# Patient Record
Sex: FEMALE | Race: WHITE | NOT HISPANIC OR LATINO | Employment: STUDENT | URBAN - METROPOLITAN AREA
[De-identification: names, ages, dates, MRNs, and addresses within clinical notes are randomized per-mention and may not be internally consistent; named-entity substitution may affect disease eponyms.]

---
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
## 2023-01-30 ENCOUNTER — OFFICE VISIT (OUTPATIENT)
Dept: URGENT CARE | Facility: CLINIC | Age: 18
End: 2023-01-30
Payer: COMMERCIAL

## 2023-01-30 VITALS
WEIGHT: 122 LBS | TEMPERATURE: 99 F | HEIGHT: 65 IN | DIASTOLIC BLOOD PRESSURE: 65 MMHG | SYSTOLIC BLOOD PRESSURE: 109 MMHG | RESPIRATION RATE: 20 BRPM | OXYGEN SATURATION: 100 % | BODY MASS INDEX: 20.33 KG/M2 | HEART RATE: 86 BPM

## 2023-01-30 DIAGNOSIS — N39.0 URINARY TRACT INFECTION WITH HEMATURIA, SITE UNSPECIFIED: Primary | ICD-10-CM

## 2023-01-30 DIAGNOSIS — R31.9 URINARY TRACT INFECTION WITH HEMATURIA, SITE UNSPECIFIED: Primary | ICD-10-CM

## 2023-01-30 LAB
B-HCG UR QL: NEGATIVE
BILIRUB UR QL STRIP: NEGATIVE
CTP QC/QA: YES
GLUCOSE UR QL STRIP: NEGATIVE
KETONES UR QL STRIP: NEGATIVE
LEUKOCYTE ESTERASE UR QL STRIP: NEGATIVE
PH, POC UA: 7.5
POC BLOOD, URINE: POSITIVE
POC NITRATES, URINE: NEGATIVE
PROT UR QL STRIP: NEGATIVE
SP GR UR STRIP: 1.01 (ref 1–1.03)
UROBILINOGEN UR STRIP-ACNC: ABNORMAL (ref 0.1–1.1)

## 2023-01-30 PROCEDURE — 99213 PR OFFICE/OUTPT VISIT, EST, LEVL III, 20-29 MIN: ICD-10-PCS | Mod: S$GLB,,, | Performed by: PHYSICIAN ASSISTANT

## 2023-01-30 PROCEDURE — 1159F MED LIST DOCD IN RCRD: CPT | Mod: CPTII,S$GLB,, | Performed by: PHYSICIAN ASSISTANT

## 2023-01-30 PROCEDURE — 3074F PR MOST RECENT SYSTOLIC BLOOD PRESSURE < 130 MM HG: ICD-10-PCS | Mod: CPTII,S$GLB,, | Performed by: PHYSICIAN ASSISTANT

## 2023-01-30 PROCEDURE — 3078F PR MOST RECENT DIASTOLIC BLOOD PRESSURE < 80 MM HG: ICD-10-PCS | Mod: CPTII,S$GLB,, | Performed by: PHYSICIAN ASSISTANT

## 2023-01-30 PROCEDURE — 81003 URINALYSIS AUTO W/O SCOPE: CPT | Mod: QW,S$GLB,, | Performed by: PHYSICIAN ASSISTANT

## 2023-01-30 PROCEDURE — 99213 OFFICE O/P EST LOW 20 MIN: CPT | Mod: S$GLB,,, | Performed by: PHYSICIAN ASSISTANT

## 2023-01-30 PROCEDURE — 3078F DIAST BP <80 MM HG: CPT | Mod: CPTII,S$GLB,, | Performed by: PHYSICIAN ASSISTANT

## 2023-01-30 PROCEDURE — 87086 URINE CULTURE/COLONY COUNT: CPT | Performed by: PHYSICIAN ASSISTANT

## 2023-01-30 PROCEDURE — 3008F BODY MASS INDEX DOCD: CPT | Mod: CPTII,S$GLB,, | Performed by: PHYSICIAN ASSISTANT

## 2023-01-30 PROCEDURE — 81025 POCT URINE PREGNANCY: ICD-10-PCS | Mod: S$GLB,,, | Performed by: PHYSICIAN ASSISTANT

## 2023-01-30 PROCEDURE — 3074F SYST BP LT 130 MM HG: CPT | Mod: CPTII,S$GLB,, | Performed by: PHYSICIAN ASSISTANT

## 2023-01-30 PROCEDURE — 81025 URINE PREGNANCY TEST: CPT | Mod: S$GLB,,, | Performed by: PHYSICIAN ASSISTANT

## 2023-01-30 PROCEDURE — 81003 POCT URINALYSIS, DIPSTICK, AUTOMATED, W/O SCOPE: ICD-10-PCS | Mod: QW,S$GLB,, | Performed by: PHYSICIAN ASSISTANT

## 2023-01-30 PROCEDURE — 87088 URINE BACTERIA CULTURE: CPT | Performed by: PHYSICIAN ASSISTANT

## 2023-01-30 PROCEDURE — 1160F PR REVIEW ALL MEDS BY PRESCRIBER/CLIN PHARMACIST DOCUMENTED: ICD-10-PCS | Mod: CPTII,S$GLB,, | Performed by: PHYSICIAN ASSISTANT

## 2023-01-30 PROCEDURE — 1159F PR MEDICATION LIST DOCUMENTED IN MEDICAL RECORD: ICD-10-PCS | Mod: CPTII,S$GLB,, | Performed by: PHYSICIAN ASSISTANT

## 2023-01-30 PROCEDURE — 3008F PR BODY MASS INDEX (BMI) DOCUMENTED: ICD-10-PCS | Mod: CPTII,S$GLB,, | Performed by: PHYSICIAN ASSISTANT

## 2023-01-30 PROCEDURE — 1160F RVW MEDS BY RX/DR IN RCRD: CPT | Mod: CPTII,S$GLB,, | Performed by: PHYSICIAN ASSISTANT

## 2023-01-30 RX ORDER — CIPROFLOXACIN 500 MG/1
500 TABLET ORAL 2 TIMES DAILY
Qty: 10 TABLET | Refills: 0 | Status: SHIPPED | OUTPATIENT
Start: 2023-01-30 | End: 2023-02-04

## 2023-01-30 NOTE — LETTER
January 30, 2023      [facility]  [address] [place] [address] [place] [address]  Phone: [phone]  Fax: [phone]       Patient: Andree Zavala   YOB: 2005  Date of Visit: 01/30/2023    To Whom It May Concern:    Joesph Zavala  was at [facility] on 01/30/2023. The patient may return to work/school on 2/1/23 with no restrictions. If you have any questions or concerns, or if I can be of further assistance, please do not hesitate to contact me.    Sincerely,    Charley Menezes PA-C

## 2023-01-30 NOTE — PROGRESS NOTES
"Subjective:       Patient ID: Andree Zavala is a 17 y.o. female.    Vitals:  height is 5' 5" (1.651 m) and weight is 55.3 kg (122 lb). Her temperature is 98.6 °F (37 °C). Her blood pressure is 109/65 and her pulse is 86. Her respiration is 20 and oxygen saturation is 100%.     Chief Complaint: Dysuria    Patient is a 17 year old female who presents to the urgent care with a one week history of dysuria and left flank pain. Patient denies fever, chills, nausea, vomiting or diarrhea. She has taken AZO and cranberry juice with relief of frequency and hematuria but still admits flank pain. Patient denies vaginal discharge or itching. No concern for pregnancy or STD. Patient is otherwise well.     Dysuria   This is a new problem. The current episode started in the past 7 days (Onset a couple of days ago). The problem occurs every urination. The problem has been gradually worsening. The patient is experiencing no pain. There has been no fever. She is Sexually active. There is No history of pyelonephritis. Associated symptoms include flank pain (left side flank pain), frequency, hematuria, hesitancy, urgency and withholding. Pertinent negatives include no chills, discharge, nausea, possible pregnancy, vomiting, bubble bath use, constipation or rash. She has tried NSAIDs (AZO, cranberry juice, ibupren) for the symptoms. The treatment provided moderate relief. There is no history of diabetes insipidus, diabetes mellitus, hypertension, kidney stones, recurrent UTIs, a single kidney or STD.     Constitution: Negative for chills and fever.   Cardiovascular:  Negative for chest pain.   Respiratory:  Negative for shortness of breath.    Gastrointestinal:  Negative for abdominal pain, nausea, vomiting and constipation.   Genitourinary:  Positive for dysuria, frequency, urgency, flank pain (left side flank pain) and hematuria. Negative for vaginal pain, vaginal discharge, vaginal odor, genital sore and pelvic pain. "   Musculoskeletal:  Negative for back pain.   Skin:  Negative for rash.     Objective:      Physical Exam   Constitutional: She is oriented to person, place, and time. She appears well-developed.   HENT:   Head: Normocephalic and atraumatic.   Ears:   Right Ear: External ear normal.   Left Ear: External ear normal.   Nose: Nose normal. No nasal deformity. No epistaxis.   Mouth/Throat: Oropharynx is clear and moist and mucous membranes are normal.   Eyes: Lids are normal.   Neck: Trachea normal and phonation normal. Neck supple.   Cardiovascular: Normal rate, regular rhythm, normal heart sounds and normal pulses.   No murmur heard.  Pulmonary/Chest: Effort normal and breath sounds normal. No respiratory distress.   Abdominal: Normal appearance. She exhibits no distension. Soft. There is no abdominal tenderness. There is no rebound, no guarding, no left CVA tenderness and no right CVA tenderness.   Neurological: She is alert and oriented to person, place, and time.   Skin: Skin is warm, dry and intact.   Psychiatric: Her speech is normal and behavior is normal.   Nursing note and vitals reviewed.      Results for orders placed or performed in visit on 01/30/23   POCT Urinalysis, Dipstick, Automated, W/O Scope   Result Value Ref Range    POC Blood, Urine Positive (A) Negative    POC Bilirubin, Urine Negative Negative    POC Urobilinogen, Urine Norm 0.1 - 1.1    POC Ketones, Urine Negative Negative    POC Protein, Urine Negative Negative    POC Nitrates, Urine Negative Negative    POC Glucose, Urine Negative Negative    pH, UA 7.5     POC Specific Gravity, Urine 1.015 1.003 - 1.029    POC Leukocytes, Urine Negative Negative   POCT urine pregnancy   Result Value Ref Range    POC Preg Test, Ur Negative Negative     Acceptable Yes        Assessment:       1. Urinary tract infection with hematuria, site unspecified            Plan:         Urinary tract infection with hematuria, site unspecified  -     POCT  Urinalysis, Dipstick, Automated, W/O Scope  -     POCT urine pregnancy  -     Culture, Urine  -     ciprofloxacin HCl (CIPRO) 500 MG tablet; Take 1 tablet (500 mg total) by mouth 2 (two) times daily. for 5 days  Dispense: 10 tablet; Refill: 0    Discussed urinalysis today.   Discussed will send urine for culture and call with results in 3-5 days.   Discussed begin taking antibiotics as prescribed for suspected UTI. Drink cranberry juice, increase fluids, and take over the counter pyridium for symptom control.   Return to Clinic if symptoms worsen, change, or persist.   ER precautions for red flag symptoms discussed with patient.     Patient verbalized understanding and agrees with plan.     Charley Menezes PA-C    Patient Instructions                                                                       UTI   If your condition worsens or fails to improve we recommend that you receive another evaluation at the ER immediately or contact your PCP to discuss your concerns or return here. You must understand that you've received an urgent care treatment only and that you may be released before all your medical problems are known or treated. You the patient will arrange for followup care as instructed.   If you were prescribed antibiotics, please take them to full completion.    If you had cultures done it will take 3-5 days to result. We will call you with the result.   If you are are female and on BCP use additional methods to prevent pregnancy while on the antibiotics and for one cycle after.   Cranberry juice may help. Get the 100% cranberry juice and mix 4 oz of juice with 4 oz of water and drink this 8 oz glass of liquid once a day.

## 2023-02-01 LAB — BACTERIA UR CULT: ABNORMAL

## 2023-07-07 ENCOUNTER — OFFICE VISIT (OUTPATIENT)
Dept: URGENT CARE | Facility: CLINIC | Age: 18
End: 2023-07-07
Payer: COMMERCIAL

## 2023-07-07 VITALS
OXYGEN SATURATION: 98 % | HEART RATE: 76 BPM | RESPIRATION RATE: 18 BRPM | DIASTOLIC BLOOD PRESSURE: 70 MMHG | TEMPERATURE: 99 F | SYSTOLIC BLOOD PRESSURE: 117 MMHG | BODY MASS INDEX: 18.83 KG/M2 | HEIGHT: 65 IN | WEIGHT: 113 LBS

## 2023-07-07 DIAGNOSIS — J00 NASOPHARYNGITIS: Primary | ICD-10-CM

## 2023-07-07 LAB
CTP QC/QA: YES
MOLECULAR STREP A: NEGATIVE

## 2023-07-07 PROCEDURE — 99213 OFFICE O/P EST LOW 20 MIN: CPT | Mod: S$GLB,,, | Performed by: PHYSICIAN ASSISTANT

## 2023-07-07 PROCEDURE — 99213 PR OFFICE/OUTPT VISIT, EST, LEVL III, 20-29 MIN: ICD-10-PCS | Mod: S$GLB,,, | Performed by: PHYSICIAN ASSISTANT

## 2023-07-07 PROCEDURE — 87651 STREP A DNA AMP PROBE: CPT | Mod: QW,S$GLB,, | Performed by: PHYSICIAN ASSISTANT

## 2023-07-07 PROCEDURE — 87651 POCT STREP A MOLECULAR: ICD-10-PCS | Mod: QW,S$GLB,, | Performed by: PHYSICIAN ASSISTANT

## 2023-07-07 RX ORDER — TRAZODONE HYDROCHLORIDE 50 MG/1
50 TABLET ORAL NIGHTLY
COMMUNITY
End: 2024-01-23

## 2023-07-07 NOTE — PATIENT INSTRUCTIONS
Claritin or Zyrtec for post nasal drip. Salt water gargles, ibuprofen or naproxen for pain. Honey teas may help. Rest and drink plenty of fluids. May buy over the counter Chloraseptic Lozenges or spray for severe pain.    Over the counter cough syrup as needed.

## 2023-07-07 NOTE — PROGRESS NOTES
"Subjective:      Patient ID: Andree Zavala is a 18 y.o. female.    Vitals:  height is 5' 5" (1.651 m) and weight is 51.3 kg (113 lb). Her temperature is 98.8 °F (37.1 °C). Her blood pressure is 117/70 and her pulse is 76. Her respiration is 18 and oxygen saturation is 98%.     Chief Complaint: Sore Throat    Sore throat, occasional dry cough, PND x 4 days. No fever, HA, body aches, CP or SOB    Sore Throat   This is a new problem. The current episode started in the past 7 days (onset monday). The problem has been unchanged. There has been no fever. The pain is at a severity of 7/10. The pain is moderate. Associated symptoms include coughing, ear pain, neck pain and trouble swallowing. Pertinent negatives include no abdominal pain, congestion, diarrhea, drooling, ear discharge, headaches, hoarse voice, plugged ear sensation, shortness of breath, stridor, swollen glands or vomiting. She has had no exposure to strep or mono. Treatments tried: ibu, advil. The treatment provided mild relief.     Constitution: Negative for chills, sweating, fatigue and fever.   HENT:  Positive for ear pain, sore throat and trouble swallowing. Negative for ear discharge, drooling and congestion.    Neck: Positive for neck pain.   Cardiovascular:  Negative for chest pain, leg swelling, palpitations and sob on exertion.   Eyes:  Negative for eye discharge, eye itching and eye pain.   Respiratory:  Positive for cough. Negative for sputum production, shortness of breath and stridor.    Gastrointestinal:  Negative for abdominal pain, vomiting and diarrhea.   Genitourinary:  Negative for dysuria.   Musculoskeletal:  Negative for muscle ache.   Neurological:  Negative for headaches.    Objective:     Physical Exam   Constitutional: She is oriented to person, place, and time. She appears well-developed. She is cooperative.  Non-toxic appearance. She does not appear ill. No distress.   HENT:   Head: Normocephalic and atraumatic.   Ears:   Right " Ear: Hearing, tympanic membrane, external ear and ear canal normal.   Left Ear: Hearing, tympanic membrane, external ear and ear canal normal.   Nose: Nose normal. No mucosal edema, rhinorrhea, nasal deformity or congestion. No epistaxis. Right sinus exhibits no maxillary sinus tenderness and no frontal sinus tenderness. Left sinus exhibits no maxillary sinus tenderness and no frontal sinus tenderness.   Mouth/Throat: Uvula is midline and mucous membranes are normal. Mucous membranes are moist. No trismus in the jaw. Normal dentition. No uvula swelling. Posterior oropharyngeal erythema and cobblestoning present. No oropharyngeal exudate or posterior oropharyngeal edema. No tonsillar exudate.   Eyes: Conjunctivae and lids are normal. No scleral icterus.   Neck: Trachea normal and phonation normal. Neck supple. No edema present. No erythema present. No neck rigidity present.   Cardiovascular: Normal rate, regular rhythm, normal heart sounds and normal pulses.   Pulmonary/Chest: Effort normal and breath sounds normal. No respiratory distress. She has no decreased breath sounds. She has no wheezes. She has no rhonchi.   Abdominal: Normal appearance. There is no abdominal tenderness.   Musculoskeletal: Normal range of motion.         General: No deformity. Normal range of motion.   Lymphadenopathy:     She has no cervical adenopathy.   Neurological: She is alert and oriented to person, place, and time. She exhibits normal muscle tone. Coordination normal.   Skin: Skin is warm, dry, intact, not diaphoretic and not pale.   Psychiatric: Her speech is normal and behavior is normal. Judgment and thought content normal.   Nursing note and vitals reviewed.    Results for orders placed or performed in visit on 07/07/23   POCT Strep A, Molecular   Result Value Ref Range    Molecular Strep A, POC Negative Negative     Acceptable Yes        Assessment:     1. Nasopharyngitis        Plan:       Nasopharyngitis  -      POCT Strep A, Molecular      Nicole Valentin PA-C  [facility]       Patient Instructions   Claritin or Zyrtec for post nasal drip. Salt water gargles, ibuprofen or naproxen for pain. Honey teas may help. Rest and drink plenty of fluids. May buy over the counter Chloraseptic Lozenges or spray for severe pain.    Over the counter cough syrup as needed.

## 2023-10-14 ENCOUNTER — OFFICE VISIT (OUTPATIENT)
Dept: INTERNAL MEDICINE | Facility: CLINIC | Age: 18
End: 2023-10-14
Payer: COMMERCIAL

## 2023-10-14 ENCOUNTER — LAB VISIT (OUTPATIENT)
Dept: LAB | Facility: HOSPITAL | Age: 18
End: 2023-10-14
Payer: COMMERCIAL

## 2023-10-14 ENCOUNTER — LAB VISIT (OUTPATIENT)
Dept: LAB | Facility: HOSPITAL | Age: 18
End: 2023-10-14
Attending: INTERNAL MEDICINE
Payer: COMMERCIAL

## 2023-10-14 VITALS
TEMPERATURE: 98 F | SYSTOLIC BLOOD PRESSURE: 118 MMHG | RESPIRATION RATE: 18 BRPM | DIASTOLIC BLOOD PRESSURE: 74 MMHG | HEIGHT: 65 IN | HEART RATE: 83 BPM | BODY MASS INDEX: 20.06 KG/M2 | WEIGHT: 120.38 LBS | OXYGEN SATURATION: 98 %

## 2023-10-14 DIAGNOSIS — F31.9 BIPOLAR AFFECTIVE DISORDER, REMISSION STATUS UNSPECIFIED: ICD-10-CM

## 2023-10-14 DIAGNOSIS — R20.0 NUMBNESS OF TONGUE: Primary | ICD-10-CM

## 2023-10-14 DIAGNOSIS — R20.0 NUMBNESS OF TONGUE: ICD-10-CM

## 2023-10-14 DIAGNOSIS — Z00.00 LABORATORY EXAM ORDERED AS PART OF ROUTINE GENERAL MEDICAL EXAMINATION: ICD-10-CM

## 2023-10-14 LAB
ALBUMIN SERPL BCP-MCNC: 4 G/DL (ref 3.5–5.2)
ALP SERPL-CCNC: 64 U/L (ref 55–135)
ALT SERPL W/O P-5'-P-CCNC: 10 U/L (ref 10–44)
ANION GAP SERPL CALC-SCNC: 8 MMOL/L (ref 8–16)
AST SERPL-CCNC: 14 U/L (ref 10–40)
BASOPHILS # BLD AUTO: 0.03 K/UL (ref 0–0.2)
BASOPHILS NFR BLD: 0.5 % (ref 0–1.9)
BILIRUB SERPL-MCNC: 0.3 MG/DL (ref 0.1–1)
BILIRUB UR QL STRIP: NEGATIVE
BUN SERPL-MCNC: 11 MG/DL (ref 6–20)
CALCIUM SERPL-MCNC: 9.6 MG/DL (ref 8.7–10.5)
CHLORIDE SERPL-SCNC: 109 MMOL/L (ref 95–110)
CHOLEST SERPL-MCNC: 171 MG/DL (ref 120–199)
CHOLEST/HDLC SERPL: 3.2 {RATIO} (ref 2–5)
CLARITY UR: CLEAR
CO2 SERPL-SCNC: 21 MMOL/L (ref 23–29)
COLOR UR: YELLOW
CREAT SERPL-MCNC: 0.7 MG/DL (ref 0.5–1.4)
DIFFERENTIAL METHOD: NORMAL
EOSINOPHIL # BLD AUTO: 0.1 K/UL (ref 0–0.5)
EOSINOPHIL NFR BLD: 2.3 % (ref 0–8)
ERYTHROCYTE [DISTWIDTH] IN BLOOD BY AUTOMATED COUNT: 13.2 % (ref 11.5–14.5)
EST. GFR  (NO RACE VARIABLE): >60 ML/MIN/1.73 M^2
ESTIMATED AVG GLUCOSE: 94 MG/DL (ref 68–131)
GLUCOSE SERPL-MCNC: 98 MG/DL (ref 70–110)
GLUCOSE UR QL STRIP: NEGATIVE
HBA1C MFR BLD: 4.9 % (ref 4–5.6)
HCT VFR BLD AUTO: 37.3 % (ref 37–48.5)
HDLC SERPL-MCNC: 53 MG/DL (ref 40–75)
HDLC SERPL: 31 % (ref 20–50)
HGB BLD-MCNC: 12.1 G/DL (ref 12–16)
HGB UR QL STRIP: ABNORMAL
IMM GRANULOCYTES # BLD AUTO: 0.01 K/UL (ref 0–0.04)
IMM GRANULOCYTES NFR BLD AUTO: 0.2 % (ref 0–0.5)
KETONES UR QL STRIP: NEGATIVE
LDLC SERPL CALC-MCNC: 106.2 MG/DL (ref 63–159)
LEUKOCYTE ESTERASE UR QL STRIP: NEGATIVE
LYMPHOCYTES # BLD AUTO: 1.8 K/UL (ref 1–4.8)
LYMPHOCYTES NFR BLD: 29.3 % (ref 18–48)
MCH RBC QN AUTO: 28.6 PG (ref 27–31)
MCHC RBC AUTO-ENTMCNC: 32.4 G/DL (ref 32–36)
MCV RBC AUTO: 88 FL (ref 82–98)
MONOCYTES # BLD AUTO: 0.7 K/UL (ref 0.3–1)
MONOCYTES NFR BLD: 12 % (ref 4–15)
NEUTROPHILS # BLD AUTO: 3.4 K/UL (ref 1.8–7.7)
NEUTROPHILS NFR BLD: 55.7 % (ref 38–73)
NITRITE UR QL STRIP: NEGATIVE
NONHDLC SERPL-MCNC: 118 MG/DL
NRBC BLD-RTO: 0 /100 WBC
PH UR STRIP: 7 [PH] (ref 5–8)
PLATELET # BLD AUTO: 310 K/UL (ref 150–450)
PMV BLD AUTO: 11.7 FL (ref 9.2–12.9)
POTASSIUM SERPL-SCNC: 4.8 MMOL/L (ref 3.5–5.1)
PROT SERPL-MCNC: 8.1 G/DL (ref 6–8.4)
PROT UR QL STRIP: NEGATIVE
RBC # BLD AUTO: 4.23 M/UL (ref 4–5.4)
SODIUM SERPL-SCNC: 138 MMOL/L (ref 136–145)
SP GR UR STRIP: 1.02 (ref 1–1.03)
T4 FREE SERPL-MCNC: 0.87 NG/DL (ref 0.71–1.51)
TRIGL SERPL-MCNC: 59 MG/DL (ref 30–150)
TSH SERPL DL<=0.005 MIU/L-ACNC: 2.47 UIU/ML (ref 0.4–4)
URN SPEC COLLECT METH UR: ABNORMAL
VIT B12 SERPL-MCNC: 472 PG/ML (ref 210–950)
WBC # BLD AUTO: 6.1 K/UL (ref 3.9–12.7)

## 2023-10-14 PROCEDURE — 99999 PR PBB SHADOW E&M-EST. PATIENT-LVL IV: ICD-10-PCS | Mod: PBBFAC,,, | Performed by: INTERNAL MEDICINE

## 2023-10-14 PROCEDURE — 1159F MED LIST DOCD IN RCRD: CPT | Mod: CPTII,S$GLB,, | Performed by: INTERNAL MEDICINE

## 2023-10-14 PROCEDURE — 85025 COMPLETE CBC W/AUTO DIFF WBC: CPT | Performed by: INTERNAL MEDICINE

## 2023-10-14 PROCEDURE — 3078F DIAST BP <80 MM HG: CPT | Mod: CPTII,S$GLB,, | Performed by: INTERNAL MEDICINE

## 2023-10-14 PROCEDURE — 82607 VITAMIN B-12: CPT | Performed by: INTERNAL MEDICINE

## 2023-10-14 PROCEDURE — 80061 LIPID PANEL: CPT | Performed by: INTERNAL MEDICINE

## 2023-10-14 PROCEDURE — 99999 PR PBB SHADOW E&M-EST. PATIENT-LVL IV: CPT | Mod: PBBFAC,,, | Performed by: INTERNAL MEDICINE

## 2023-10-14 PROCEDURE — 80053 COMPREHEN METABOLIC PANEL: CPT | Performed by: INTERNAL MEDICINE

## 2023-10-14 PROCEDURE — 36415 COLL VENOUS BLD VENIPUNCTURE: CPT | Performed by: INTERNAL MEDICINE

## 2023-10-14 PROCEDURE — 1159F PR MEDICATION LIST DOCUMENTED IN MEDICAL RECORD: ICD-10-PCS | Mod: CPTII,S$GLB,, | Performed by: INTERNAL MEDICINE

## 2023-10-14 PROCEDURE — 99204 OFFICE O/P NEW MOD 45 MIN: CPT | Mod: S$GLB,,, | Performed by: INTERNAL MEDICINE

## 2023-10-14 PROCEDURE — 83036 HEMOGLOBIN GLYCOSYLATED A1C: CPT | Performed by: INTERNAL MEDICINE

## 2023-10-14 PROCEDURE — 99204 PR OFFICE/OUTPT VISIT, NEW, LEVL IV, 45-59 MIN: ICD-10-PCS | Mod: S$GLB,,, | Performed by: INTERNAL MEDICINE

## 2023-10-14 PROCEDURE — 3078F PR MOST RECENT DIASTOLIC BLOOD PRESSURE < 80 MM HG: ICD-10-PCS | Mod: CPTII,S$GLB,, | Performed by: INTERNAL MEDICINE

## 2023-10-14 PROCEDURE — 3008F BODY MASS INDEX DOCD: CPT | Mod: CPTII,S$GLB,, | Performed by: INTERNAL MEDICINE

## 2023-10-14 PROCEDURE — 3008F PR BODY MASS INDEX (BMI) DOCUMENTED: ICD-10-PCS | Mod: CPTII,S$GLB,, | Performed by: INTERNAL MEDICINE

## 2023-10-14 PROCEDURE — 3074F SYST BP LT 130 MM HG: CPT | Mod: CPTII,S$GLB,, | Performed by: INTERNAL MEDICINE

## 2023-10-14 PROCEDURE — 84443 ASSAY THYROID STIM HORMONE: CPT | Performed by: INTERNAL MEDICINE

## 2023-10-14 PROCEDURE — 86592 SYPHILIS TEST NON-TREP QUAL: CPT | Performed by: INTERNAL MEDICINE

## 2023-10-14 PROCEDURE — 84439 ASSAY OF FREE THYROXINE: CPT | Performed by: INTERNAL MEDICINE

## 2023-10-14 PROCEDURE — 81003 URINALYSIS AUTO W/O SCOPE: CPT | Performed by: INTERNAL MEDICINE

## 2023-10-14 PROCEDURE — 3074F PR MOST RECENT SYSTOLIC BLOOD PRESSURE < 130 MM HG: ICD-10-PCS | Mod: CPTII,S$GLB,, | Performed by: INTERNAL MEDICINE

## 2023-10-14 RX ORDER — CLINDAMYCIN PHOSPHATE AND BENZOYL PEROXIDE 10; 37.5 MG/G; MG/G
1 GEL TOPICAL
COMMUNITY
Start: 2023-07-06 | End: 2023-12-29 | Stop reason: ALTCHOICE

## 2023-10-14 RX ORDER — MUPIROCIN 20 MG/G
OINTMENT TOPICAL 3 TIMES DAILY
COMMUNITY
Start: 2023-10-11 | End: 2023-12-29 | Stop reason: ALTCHOICE

## 2023-10-14 RX ORDER — LAMOTRIGINE 150 MG/1
150 TABLET ORAL
COMMUNITY
Start: 2023-09-26 | End: 2024-01-23

## 2023-10-14 RX ORDER — IBUPROFEN 600 MG/1
600 TABLET ORAL
COMMUNITY
Start: 2023-10-11 | End: 2024-01-23

## 2023-10-14 RX ORDER — FLUCONAZOLE 150 MG/1
150 TABLET ORAL
COMMUNITY
Start: 2023-09-22 | End: 2023-11-05

## 2023-10-14 RX ORDER — BUPROPION HYDROCHLORIDE 150 MG/1
150 TABLET ORAL
COMMUNITY
Start: 2023-10-11 | End: 2024-01-23

## 2023-10-14 NOTE — PROGRESS NOTES
"Subjective     Patient ID: Andree Zavala is a 18 y.o. female.    Chief Complaint: Numbness (Throat,Tongue, Mouth)      HPI  Numbness of tongue x 1 week.  Not painful.  Feels like when goes to get dental work.  No ulcers etc.  No tooth pain.  In the last couple of days now with only right side of tongue and cheek/gingiva numb.  No trouble with moving tongue or talking.  Just numb.  Never happened before.  She has been on Lamictal for about 8 months; new psychiatrist is beginning to wean it down and started Wellbutrin.  Denies trauma to mouth etc.  No lesions.    Past Medical History:   Diagnosis Date    Asthma      Review of patient's allergies indicates:  No Known Allergies  Past Surgical History:   Procedure Laterality Date    TONSILLECTOMY       Family History   Problem Relation Age of Onset    Hypertension Father      Social History     Socioeconomic History    Marital status: Single   Tobacco Use    Smoking status: Never     Passive exposure: Never    Smokeless tobacco: Never   Substance and Sexual Activity    Alcohol use: No    Drug use: Never    Sexual activity: Yes     Partners: Male     Birth control/protection: Condom     Comment: Birth control pills         /74 (BP Location: Left arm, Patient Position: Sitting, BP Method: Medium (Manual))   Pulse 83   Temp 98.3 °F (36.8 °C) (Tympanic)   Resp 18   Ht 5' 5" (1.651 m)   Wt 54.6 kg (120 lb 5.9 oz)   SpO2 98%   BMI 20.03 kg/m²   Outpatient Medications as of 10/14/2023   Medication Sig Dispense Refill    buPROPion (WELLBUTRIN XL) 150 MG TB24 tablet Take 150 mg by mouth.      ibuprofen (ADVIL,MOTRIN) 600 MG tablet Take 600 mg by mouth.      lamoTRIgine (LAMICTAL) 150 MG Tab Take 150 mg by mouth.      mupirocin (BACTROBAN) 2 % ointment Apply topically 3 (three) times daily.      norelgestromin-ethinyl estradiol (XULANE) 150-35 mcg/24 hr APPLY 1 PATCH TOPICALLY TO THE SKIN 1 TIME A WEEK 3 patch 11    ONEXTON 1.2 %(1 % base) -3.75 % GlwP Apply 1 " application  topically.      traZODone (DESYREL) 50 MG tablet Take 50 mg by mouth every evening.      fluconazole (DIFLUCAN) 150 MG Tab Take 150 mg by mouth Every 3 (three) days.       No current facility-administered medications on file as of 10/14/2023.       Review of Systems   Constitutional:  Negative for activity change and unexpected weight change.   HENT:  Negative for hearing loss, rhinorrhea and trouble swallowing.    Eyes:  Negative for discharge and visual disturbance.   Respiratory:  Negative for chest tightness and wheezing.    Cardiovascular:  Negative for chest pain and palpitations.   Gastrointestinal:  Negative for blood in stool, constipation, diarrhea and vomiting.   Endocrine: Negative for polydipsia and polyuria.   Genitourinary:  Negative for difficulty urinating, dysuria, hematuria and menstrual problem.   Musculoskeletal:  Negative for arthralgias, joint swelling and neck pain.   Neurological:  Negative for weakness and headaches.   Psychiatric/Behavioral:  Negative for confusion and dysphoric mood.    All other systems reviewed and are negative.         Objective     Physical Exam  Constitutional:       General: She is not in acute distress.     Appearance: Normal appearance. She is not ill-appearing, toxic-appearing or diaphoretic.   HENT:      Head: Normocephalic and atraumatic.      Mouth/Throat:      Mouth: Mucous membranes are moist.      Pharynx: Oropharynx is clear. No oropharyngeal exudate or posterior oropharyngeal erythema.   Eyes:      Conjunctiva/sclera: Conjunctivae normal.   Cardiovascular:      Rate and Rhythm: Normal rate.   Pulmonary:      Effort: Pulmonary effort is normal. No respiratory distress.      Breath sounds: Normal breath sounds. No wheezing or rales.   Musculoskeletal:      Cervical back: Neck supple.   Lymphadenopathy:      Cervical: No cervical adenopathy.   Skin:     General: Skin is dry.   Neurological:      General: No focal deficit present.      Mental  Status: She is alert and oriented to person, place, and time.      Cranial Nerves: No cranial nerve deficit.   Psychiatric:         Mood and Affect: Mood normal.         Behavior: Behavior normal.            Assessment and Plan     1. Numbness of tongue  -     TSH; Future; Expected date: 10/14/2023  -     T4, Free; Future; Expected date: 10/14/2023  -     VITAMIN B12; Future; Expected date: 10/14/2023  -     RPR; Future; Expected date: 10/14/2023    2. Laboratory exam ordered as part of routine general medical examination  -     Lipid Panel; Future; Expected date: 10/14/2023  -     Urinalysis; Future; Expected date: 10/14/2023  -     Hemoglobin A1C; Future; Expected date: 10/14/2023  -     TSH; Future; Expected date: 10/14/2023  -     Comprehensive Metabolic Panel; Future; Expected date: 10/14/2023  -     CBC Auto Differential; Future; Expected date: 10/14/2023  -     T4, Free; Future; Expected date: 10/14/2023  -     VITAMIN B12; Future; Expected date: 10/14/2023  -     RPR; Future; Expected date: 10/14/2023    3. Bipolar affective disorder, remission status unspecified  Comments:  pt seeing new psychiatrist & states this dx is questionable; weaning Lamictal/started Wellbutrin       We discussed ddx.  Has never had a sensory episode before or any motor episodes.  We discussed things such as MS.  We discussed after reviewing Lamictal literature paresthesias as a possibility.  Will start with labs and she will f/u to let MD know this week of any changes.  Pt is in agreement with plan.      Immunization History   Administered Date(s) Administered    DTP 2005, 2005, 2005, 10/19/2004    DTaP 06/28/2007    HPV Quadrivalent 08/08/2014, 10/16/2014, 02/23/2015    Hep B / HiB 2003, 2003, 10/19/2004    Hepatitis A, Pediatric/Adolescent, 2 Dose 06/28/2007, 08/24/2010    HiB PRP-T 2003    IPV 2003, 2003, 2003, 06/28/2007    Influenza (Flumist) - Quadrivalent - Intranasal  *Preferred* (2-49 years old) 09/26/2013, 11/12/2014, 10/29/2015    Influenza - Quadrivalent - PF *Preferred* (6 months and older) 12/09/2016, 12/13/2017    MMR 05/17/2004, 06/28/2007    Pneumococcal Conjugate - 7 Valent 2003, 2003, 2003, 10/19/2004    Tdap 08/08/2014    Varicella 05/17/2004, 06/28/2007    meningococcal Conjugate (MCV4O) 08/08/2014     I spent a total of 45 minutes on the day of the visit.This includes face to face time and non-face to face time preparing to see the patient (eg, review of tests), obtaining and/or reviewing separately obtained history, documenting clinical information in the electronic or other health record, independently interpreting results and communicating results to the patient/family/caregiver, or care coordinator.

## 2023-10-16 LAB — RPR SER QL: NORMAL

## 2023-11-03 ENCOUNTER — LAB VISIT (OUTPATIENT)
Dept: LAB | Facility: HOSPITAL | Age: 18
End: 2023-11-03
Attending: PSYCHIATRY & NEUROLOGY
Payer: COMMERCIAL

## 2023-11-03 ENCOUNTER — OFFICE VISIT (OUTPATIENT)
Dept: NEUROLOGY | Facility: CLINIC | Age: 18
End: 2023-11-03
Payer: COMMERCIAL

## 2023-11-03 VITALS
WEIGHT: 115.06 LBS | SYSTOLIC BLOOD PRESSURE: 134 MMHG | BODY MASS INDEX: 19.17 KG/M2 | HEIGHT: 65 IN | DIASTOLIC BLOOD PRESSURE: 78 MMHG | HEART RATE: 106 BPM

## 2023-11-03 DIAGNOSIS — G37.9 CNS DEMYELINATING DISEASE: Primary | ICD-10-CM

## 2023-11-03 DIAGNOSIS — R42 DIZZINESS AND GIDDINESS: ICD-10-CM

## 2023-11-03 DIAGNOSIS — G37.9 DEMYELINATING DISEASE OF CENTRAL NERVOUS SYSTEM, UNSPECIFIED: ICD-10-CM

## 2023-11-03 DIAGNOSIS — G37.9 CNS DEMYELINATING DISEASE: ICD-10-CM

## 2023-11-03 DIAGNOSIS — M48.02 SPINAL STENOSIS, CERVICAL REGION: ICD-10-CM

## 2023-11-03 PROCEDURE — 1160F PR REVIEW ALL MEDS BY PRESCRIBER/CLIN PHARMACIST DOCUMENTED: ICD-10-PCS | Mod: CPTII,S$GLB,, | Performed by: PSYCHIATRY & NEUROLOGY

## 2023-11-03 PROCEDURE — 3008F BODY MASS INDEX DOCD: CPT | Mod: CPTII,S$GLB,, | Performed by: PSYCHIATRY & NEUROLOGY

## 2023-11-03 PROCEDURE — 3075F PR MOST RECENT SYSTOLIC BLOOD PRESS GE 130-139MM HG: ICD-10-PCS | Mod: CPTII,S$GLB,, | Performed by: PSYCHIATRY & NEUROLOGY

## 2023-11-03 PROCEDURE — 99999 PR PBB SHADOW E&M-EST. PATIENT-LVL V: CPT | Mod: PBBFAC,,, | Performed by: PSYCHIATRY & NEUROLOGY

## 2023-11-03 PROCEDURE — 1159F MED LIST DOCD IN RCRD: CPT | Mod: CPTII,S$GLB,, | Performed by: PSYCHIATRY & NEUROLOGY

## 2023-11-03 PROCEDURE — 99999 PR PBB SHADOW E&M-EST. PATIENT-LVL V: ICD-10-PCS | Mod: PBBFAC,,, | Performed by: PSYCHIATRY & NEUROLOGY

## 2023-11-03 PROCEDURE — 3044F PR MOST RECENT HEMOGLOBIN A1C LEVEL <7.0%: ICD-10-PCS | Mod: CPTII,S$GLB,, | Performed by: PSYCHIATRY & NEUROLOGY

## 2023-11-03 PROCEDURE — 3075F SYST BP GE 130 - 139MM HG: CPT | Mod: CPTII,S$GLB,, | Performed by: PSYCHIATRY & NEUROLOGY

## 2023-11-03 PROCEDURE — 3044F HG A1C LEVEL LT 7.0%: CPT | Mod: CPTII,S$GLB,, | Performed by: PSYCHIATRY & NEUROLOGY

## 2023-11-03 PROCEDURE — 3078F DIAST BP <80 MM HG: CPT | Mod: CPTII,S$GLB,, | Performed by: PSYCHIATRY & NEUROLOGY

## 2023-11-03 PROCEDURE — 3078F PR MOST RECENT DIASTOLIC BLOOD PRESSURE < 80 MM HG: ICD-10-PCS | Mod: CPTII,S$GLB,, | Performed by: PSYCHIATRY & NEUROLOGY

## 2023-11-03 PROCEDURE — 1160F RVW MEDS BY RX/DR IN RCRD: CPT | Mod: CPTII,S$GLB,, | Performed by: PSYCHIATRY & NEUROLOGY

## 2023-11-03 PROCEDURE — 99203 PR OFFICE/OUTPT VISIT, NEW, LEVL III, 30-44 MIN: ICD-10-PCS | Mod: S$GLB,,, | Performed by: PSYCHIATRY & NEUROLOGY

## 2023-11-03 PROCEDURE — 1159F PR MEDICATION LIST DOCUMENTED IN MEDICAL RECORD: ICD-10-PCS | Mod: CPTII,S$GLB,, | Performed by: PSYCHIATRY & NEUROLOGY

## 2023-11-03 PROCEDURE — 82565 ASSAY OF CREATININE: CPT | Performed by: PSYCHIATRY & NEUROLOGY

## 2023-11-03 PROCEDURE — 99203 OFFICE O/P NEW LOW 30 MIN: CPT | Mod: S$GLB,,, | Performed by: PSYCHIATRY & NEUROLOGY

## 2023-11-03 PROCEDURE — 36415 COLL VENOUS BLD VENIPUNCTURE: CPT | Performed by: PSYCHIATRY & NEUROLOGY

## 2023-11-03 PROCEDURE — 3008F PR BODY MASS INDEX (BMI) DOCUMENTED: ICD-10-PCS | Mod: CPTII,S$GLB,, | Performed by: PSYCHIATRY & NEUROLOGY

## 2023-11-03 NOTE — PROGRESS NOTES
"Subjective:      Patient ID: Andree Zavala is a 18 y.o. female.    Chief Complaint: Numbness / tingling.       History of Present Illness  HPI  Patient came with Her Mother for the evaluation of sensory discomfort. She indicated about 2 to 3 months ago,  started feeling shooting sensation on her lower abdomen and pelvis areas trigger by flexing her neck, lasting few   minutes ( 2 to 3 ) / just to go away without any residual deficits / few weeks after had an episode of right face / mouth   and tongue numbness that still persist / few days ago - all of the sudden had an episode of dizziness and vision changes,  describes as sensation of " things moving toward Her " and feeling of unsteadiness.   All these symptoms have somehow ameliorates  their intensity since they started.  Denied Pain / weakness.     Review of Systems  Review of Systems   Neurological:  Positive for dizziness and numbness.   All other systems reviewed and are negative.    Objective:     Neurologic Exam     Mental Status   Oriented to person, place, and time.   Registration: recalls 3 of 3 objects. Recall at 5 minutes: recalls 3 of 3 objects. Follows 3 step commands.   Attention: normal. Concentration: normal.   Speech: speech is normal   Level of consciousness: alert  Knowledge: good. Able to perform simple calculations.   Able to name object. Able to read. Able to repeat. Able to write. Normal comprehension.     Cranial Nerves   Cranial nerves II through XII intact.     CN III, IV, VI   Pupils are equal, round, and reactive to light.    Motor Exam   Muscle bulk: normal  Overall muscle tone: normal    Strength   Strength 5/5 throughout.   Right neck flexion: 5/5  Left neck flexion: 5/5  Right neck extension: 5/5  Left neck extension: 5/5  Right deltoid: 5/5  Left deltoid: 5/5  Right biceps: 5/5  Left biceps: 5/5  Right triceps: 5/5  Left triceps: 5/5  Right wrist flexion: 5/5  Left wrist flexion: 5/5  Right wrist extension: 5/5  Left wrist " extension: 5/5  Right interossei: 5/5  Left interossei: 5/5  Right abdominals: 5/5  Left abdominals: 5/5  Right iliopsoas: 5/5  Left iliopsoas: 5/5  Right quadriceps: 5/5  Left quadriceps: 5/5  Right hamstrin/5  Left hamstrin/5  Right glutei: 5/5  Left glutei: 5/5  Right anterior tibial: 5/5  Left anterior tibial: 5/5  Right posterior tibial: 5/5  Left posterior tibial: 5/5  Right peroneal: 5/5  Left peroneal: 5/5  Right gastroc: 5/5  Left gastroc: 5/5    Sensory Exam   Light touch normal.   Vibration normal.   Proprioception normal.   Pinprick normal.   Graphesthesia: normal  Stereognosis: normal    Gait, Coordination, and Reflexes     Gait  Gait: normal    Coordination   Romberg: negative  Finger to nose coordination: normal  Heel to shin coordination: normal  Tandem walking coordination: normal    Tremor   Resting tremor: absent  Intention tremor: absent  Action tremor: absent    Reflexes   Right brachioradialis: 2+  Left brachioradialis: 2+  Right biceps: 2+  Left biceps: 2+  Right triceps: 2+  Left triceps: 2+  Right patellar: 2+  Left patellar: 2+  Right achilles: 2+  Left achilles: 2+  Right : 2+  Left : 2+  Right Gamez: absent  Left Gamez: absent  Right ankle clonus: absent  Left ankle clonus: absent  Right pendular knee jerk: absent  Left pendular knee jerk: absent      Physical Exam  Constitutional:       Appearance: Normal appearance. She is normal weight.   HENT:      Head: Normocephalic and atraumatic.      Right Ear: Tympanic membrane normal.      Left Ear: Tympanic membrane normal.      Nose: Nose normal.      Mouth/Throat:      Mouth: Mucous membranes are moist.      Pharynx: Oropharynx is clear.   Eyes:      Extraocular Movements: Extraocular movements intact.      Conjunctiva/sclera: Conjunctivae normal.      Pupils: Pupils are equal, round, and reactive to light.   Cardiovascular:      Rate and Rhythm: Normal rate and regular rhythm.      Pulses: Normal pulses.      Heart  sounds: Normal heart sounds.   Pulmonary:      Effort: Pulmonary effort is normal.      Breath sounds: Normal breath sounds.   Abdominal:      General: Abdomen is flat. Bowel sounds are normal.      Palpations: Abdomen is soft.   Genitourinary:     Comments: Differed.   Musculoskeletal:         General: Normal range of motion.      Cervical back: Normal range of motion and neck supple.   Skin:     General: Skin is warm and dry.      Capillary Refill: Capillary refill takes less than 2 seconds.   Neurological:      General: No focal deficit present.      Mental Status: She is alert and oriented to person, place, and time.      Cranial Nerves: Cranial nerves 2-12 are intact.      Motor: Motor strength is normal.     Coordination: Finger-Nose-Finger Test, Heel to Shin Test and Romberg Test normal.      Gait: Gait is intact. Tandem walk normal.      Deep Tendon Reflexes:      Reflex Scores:       Tricep reflexes are 2+ on the right side and 2+ on the left side.       Bicep reflexes are 2+ on the right side and 2+ on the left side.       Brachioradialis reflexes are 2+ on the right side and 2+ on the left side.       Patellar reflexes are 2+ on the right side and 2+ on the left side.       Achilles reflexes are 2+ on the right side and 2+ on the left side.  Psychiatric:         Mood and Affect: Mood normal.         Speech: Speech normal.         Behavior: Behavior normal.         Thought Content: Thought content normal.         Judgment: Judgment normal.          Assessment:   Patient Neurological Assessment is non focal.   - Multiple Sclerosis.   - Demyelinating disease.  - Depression Ds.   - Anxiety Ds.      Plan:   Patient is young / C. Female at the perfect age for autoimmune ds like MS, also has closed Family with Autoimmune Ds.   In the other hand She is on some psychoactive medications, Depression / Anxiety has to be kept in consideration too.   Patient has an Aunt with SLE.   MRI Brain / Cervical / Thoracic  spines W and W/ o contrast.   Labs: CBC / CMP / RPR / TSH / Free T 4 / Pregnancy test / Hgb A 1 C / Lipids panel done 10/ 2023 - non significant abnormalities.       Jeff Rodríguez MD.

## 2023-11-04 LAB
CREAT SERPL-MCNC: 0.7 MG/DL (ref 0.5–1.4)
EST. GFR  (NO RACE VARIABLE): >60 ML/MIN/1.73 M^2

## 2023-11-28 ENCOUNTER — OFFICE VISIT (OUTPATIENT)
Dept: URGENT CARE | Facility: CLINIC | Age: 18
End: 2023-11-28
Payer: COMMERCIAL

## 2023-11-28 VITALS
DIASTOLIC BLOOD PRESSURE: 80 MMHG | RESPIRATION RATE: 18 BRPM | HEIGHT: 66 IN | HEART RATE: 78 BPM | WEIGHT: 119.63 LBS | SYSTOLIC BLOOD PRESSURE: 138 MMHG | BODY MASS INDEX: 19.22 KG/M2 | OXYGEN SATURATION: 97 % | TEMPERATURE: 99 F

## 2023-11-28 DIAGNOSIS — U07.1 COVID-19 VIRUS INFECTION: Primary | ICD-10-CM

## 2023-11-28 DIAGNOSIS — R05.9 COUGH, UNSPECIFIED TYPE: ICD-10-CM

## 2023-11-28 LAB
CTP QC/QA: YES
CTP QC/QA: YES
POC MOLECULAR INFLUENZA A AGN: NEGATIVE
POC MOLECULAR INFLUENZA B AGN: NEGATIVE
SARS-COV-2 AG RESP QL IA.RAPID: POSITIVE

## 2023-11-28 PROCEDURE — 87502 POCT INFLUENZA A/B MOLECULAR: ICD-10-PCS | Mod: QW,S$GLB,, | Performed by: PHYSICIAN ASSISTANT

## 2023-11-28 PROCEDURE — 99214 PR OFFICE/OUTPT VISIT, EST, LEVL IV, 30-39 MIN: ICD-10-PCS | Mod: S$GLB,,, | Performed by: PHYSICIAN ASSISTANT

## 2023-11-28 PROCEDURE — 87502 INFLUENZA DNA AMP PROBE: CPT | Mod: QW,S$GLB,, | Performed by: PHYSICIAN ASSISTANT

## 2023-11-28 PROCEDURE — 87811 SARS CORONAVIRUS 2 ANTIGEN POCT, MANUAL READ: ICD-10-PCS | Mod: QW,S$GLB,, | Performed by: PHYSICIAN ASSISTANT

## 2023-11-28 PROCEDURE — 99214 OFFICE O/P EST MOD 30 MIN: CPT | Mod: S$GLB,,, | Performed by: PHYSICIAN ASSISTANT

## 2023-11-28 PROCEDURE — 87811 SARS-COV-2 COVID19 W/OPTIC: CPT | Mod: QW,S$GLB,, | Performed by: PHYSICIAN ASSISTANT

## 2023-11-28 NOTE — LETTER
November 28, 2023      [facility] [address] [place] [address]  Phone: [phone]  Fax: [phone]       Patient: Andree Zavala   YOB: 2005  Date of Visit: 11/28/2023    To Whom It May Concern:    Joesph Zavala  was at [facility] on 11/28/2023. The patient may return to work/school on 12/4/2023 with no restrictions. If you have any questions or concerns, or if I can be of further assistance, please do not hesitate to contact me.    Sincerely,      Jaswinder Benjamin PA-C

## 2023-11-28 NOTE — PROGRESS NOTES
"Subjective:      Patient ID: Andree Zavala is a 18 y.o. female.    Vitals:  height is 5' 5.75" (1.67 m) and weight is 54.2 kg (119 lb 9.6 oz). Her oral temperature is 98.7 °F (37.1 °C). Her blood pressure is 138/80 and her pulse is 78. Her respiration is 18 and oxygen saturation is 97%.     Chief Complaint: Generalized Body Aches    Patient in today with chest congestion, cough and scratchy throat and runny nose that started this morning upon waking.  Denies fever or chills.  Mild body aches.  No known sick contacts.  She has taken Ibuprofen with some relief.    Other  This is a new problem. The current episode started today. The problem has been unchanged. Associated symptoms include congestion, coughing, fatigue and headaches. Pertinent negatives include no nausea. Associated symptoms comments: Patient states she has congestion in the nose and chest.. Treatments tried: Patient took ibuprofen this am. The treatment provided mild relief.       Constitution: Positive for fatigue.   HENT:  Positive for congestion.    Respiratory:  Positive for cough.    Gastrointestinal:  Negative for nausea.   Neurological:  Positive for headaches.      Objective:     Physical Exam   Constitutional: She is oriented to person, place, and time. She appears well-developed.   HENT:   Head: Normocephalic and atraumatic.   Ears:   Right Ear: Hearing, tympanic membrane, external ear and ear canal normal.   Left Ear: Hearing, tympanic membrane, external ear and ear canal normal.   Nose: Rhinorrhea present.   Mouth/Throat: Uvula is midline, oropharynx is clear and moist and mucous membranes are normal. Mucous membranes are moist. No tonsillar exudate.   Eyes: Conjunctivae and EOM are normal. Pupils are equal, round, and reactive to light.   Neck: Neck supple.   Cardiovascular: Normal rate, regular rhythm, normal heart sounds and normal pulses.   Pulmonary/Chest: Effort normal and breath sounds normal.   Musculoskeletal: Normal range of " motion.         General: Normal range of motion.   Neurological: She is alert and oriented to person, place, and time.   Skin: Skin is warm and dry.   Vitals reviewed.      Assessment:     1. COVID-19 virus infection    2. Cough, unspecified type        Plan:       COVID-19 virus infection    Cough, unspecified type  -     POCT Influenza A/B Molecular  -     SARS Coronavirus 2 Antigen, POCT Manual Read      Results for orders placed or performed in visit on 11/28/23   POCT Influenza A/B Molecular   Result Value Ref Range    POC Molecular Influenza A Ag Negative Negative, Not Reported    POC Molecular Influenza B Ag Negative Negative, Not Reported     Acceptable Yes    SARS Coronavirus 2 Antigen, POCT Manual Read   Result Value Ref Range    SARS Coronavirus 2 Antigen Positive (A) Negative     Acceptable Yes          Increase fluids.  Get plenty of rest.   Normal saline nasal wash to irrigate sinuses and for congestion/runny nose.  Cool mist humidifier/vaporizer.  Practice good handwashing.  Mucinex for cough and chest congestion.  Tylenol or Ibuprofen for fever, headache and body aches.  Warm salt water gargles for throat comfort.  Chloraseptic spray or lozenges for throat comfort.  See PCP or go to ER if symptoms worsen or fail to improve with treatment.

## 2023-12-07 ENCOUNTER — HOSPITAL ENCOUNTER (OUTPATIENT)
Dept: RADIOLOGY | Facility: HOSPITAL | Age: 18
Discharge: HOME | End: 2023-12-07
Attending: PSYCHIATRY & NEUROLOGY
Payer: COMMERCIAL

## 2023-12-07 ENCOUNTER — PATIENT MESSAGE (OUTPATIENT)
Dept: NEUROLOGY | Facility: CLINIC | Age: 18
End: 2023-12-07
Payer: COMMERCIAL

## 2023-12-07 DIAGNOSIS — G37.9 DEMYELINATING DISEASE OF CENTRAL NERVOUS SYSTEM, UNSPECIFIED: ICD-10-CM

## 2023-12-07 DIAGNOSIS — G37.9 CNS DEMYELINATING DISEASE: ICD-10-CM

## 2023-12-07 DIAGNOSIS — R42 DIZZINESS AND GIDDINESS: ICD-10-CM

## 2023-12-07 DIAGNOSIS — M48.02 SPINAL STENOSIS, CERVICAL REGION: ICD-10-CM

## 2023-12-07 PROCEDURE — 72156 MRI CERVICAL SPINE DEMYELINATING W W/O CONTRAST: ICD-10-PCS | Mod: 26,,, | Performed by: RADIOLOGY

## 2023-12-07 PROCEDURE — 72157 MRI CHEST SPINE W/O & W/DYE: CPT | Mod: 26,,, | Performed by: RADIOLOGY

## 2023-12-07 PROCEDURE — 25500020 PHARM REV CODE 255: Performed by: PSYCHIATRY & NEUROLOGY

## 2023-12-07 PROCEDURE — A9585 GADOBUTROL INJECTION: HCPCS | Performed by: PSYCHIATRY & NEUROLOGY

## 2023-12-07 PROCEDURE — 70553 MRI BRAIN STEM W/O & W/DYE: CPT | Mod: 26,,, | Performed by: RADIOLOGY

## 2023-12-07 PROCEDURE — 72156 MRI NECK SPINE W/O & W/DYE: CPT | Mod: 26,,, | Performed by: RADIOLOGY

## 2023-12-07 PROCEDURE — 70553 MRI BRAIN DEMYELINATING W/ WO CONTRAST: ICD-10-PCS | Mod: 26,,, | Performed by: RADIOLOGY

## 2023-12-07 PROCEDURE — 72157 MRI THORACIC SPINE DEMYELINATING W W/O CONTRAST: ICD-10-PCS | Mod: 26,,, | Performed by: RADIOLOGY

## 2023-12-07 PROCEDURE — 72157 MRI CHEST SPINE W/O & W/DYE: CPT | Mod: TC

## 2023-12-07 PROCEDURE — 70553 MRI BRAIN STEM W/O & W/DYE: CPT | Mod: TC

## 2023-12-07 PROCEDURE — 72156 MRI NECK SPINE W/O & W/DYE: CPT | Mod: TC

## 2023-12-07 RX ORDER — GADOBUTROL 604.72 MG/ML
5.5 INJECTION INTRAVENOUS
Status: COMPLETED | OUTPATIENT
Start: 2023-12-07 | End: 2023-12-07

## 2023-12-07 RX ADMIN — GADOBUTROL 5.5 ML: 604.72 INJECTION INTRAVENOUS at 01:12

## 2023-12-29 ENCOUNTER — LAB VISIT (OUTPATIENT)
Dept: LAB | Facility: HOSPITAL | Age: 18
End: 2023-12-29
Attending: PHYSICIAN ASSISTANT
Payer: COMMERCIAL

## 2023-12-29 ENCOUNTER — OFFICE VISIT (OUTPATIENT)
Dept: INTERNAL MEDICINE | Facility: CLINIC | Age: 18
End: 2023-12-29
Payer: COMMERCIAL

## 2023-12-29 VITALS
BODY MASS INDEX: 18.29 KG/M2 | WEIGHT: 112.44 LBS | OXYGEN SATURATION: 98 % | SYSTOLIC BLOOD PRESSURE: 126 MMHG | TEMPERATURE: 97 F | HEART RATE: 98 BPM | DIASTOLIC BLOOD PRESSURE: 78 MMHG | RESPIRATION RATE: 16 BRPM

## 2023-12-29 DIAGNOSIS — Z02.0 SCHOOL PHYSICAL EXAM: ICD-10-CM

## 2023-12-29 DIAGNOSIS — Z02.0 SCHOOL PHYSICAL EXAM: Primary | ICD-10-CM

## 2023-12-29 PROCEDURE — 86706 HEP B SURFACE ANTIBODY: CPT | Performed by: PHYSICIAN ASSISTANT

## 2023-12-29 PROCEDURE — 99999 PR PBB SHADOW E&M-EST. PATIENT-LVL III: CPT | Mod: PBBFAC,,, | Performed by: PHYSICIAN ASSISTANT

## 2023-12-29 PROCEDURE — 3044F PR MOST RECENT HEMOGLOBIN A1C LEVEL <7.0%: ICD-10-PCS | Mod: CPTII,S$GLB,, | Performed by: PHYSICIAN ASSISTANT

## 2023-12-29 PROCEDURE — 3044F HG A1C LEVEL LT 7.0%: CPT | Mod: CPTII,S$GLB,, | Performed by: PHYSICIAN ASSISTANT

## 2023-12-29 PROCEDURE — 3008F PR BODY MASS INDEX (BMI) DOCUMENTED: ICD-10-PCS | Mod: CPTII,S$GLB,, | Performed by: PHYSICIAN ASSISTANT

## 2023-12-29 PROCEDURE — 1160F PR REVIEW ALL MEDS BY PRESCRIBER/CLIN PHARMACIST DOCUMENTED: ICD-10-PCS | Mod: CPTII,S$GLB,, | Performed by: PHYSICIAN ASSISTANT

## 2023-12-29 PROCEDURE — 99213 OFFICE O/P EST LOW 20 MIN: CPT | Mod: S$GLB,,, | Performed by: PHYSICIAN ASSISTANT

## 2023-12-29 PROCEDURE — 1159F PR MEDICATION LIST DOCUMENTED IN MEDICAL RECORD: ICD-10-PCS | Mod: CPTII,S$GLB,, | Performed by: PHYSICIAN ASSISTANT

## 2023-12-29 PROCEDURE — 3078F DIAST BP <80 MM HG: CPT | Mod: CPTII,S$GLB,, | Performed by: PHYSICIAN ASSISTANT

## 2023-12-29 PROCEDURE — 36415 COLL VENOUS BLD VENIPUNCTURE: CPT | Performed by: PHYSICIAN ASSISTANT

## 2023-12-29 PROCEDURE — 1159F MED LIST DOCD IN RCRD: CPT | Mod: CPTII,S$GLB,, | Performed by: PHYSICIAN ASSISTANT

## 2023-12-29 PROCEDURE — 3078F PR MOST RECENT DIASTOLIC BLOOD PRESSURE < 80 MM HG: ICD-10-PCS | Mod: CPTII,S$GLB,, | Performed by: PHYSICIAN ASSISTANT

## 2023-12-29 PROCEDURE — 86480 TB TEST CELL IMMUN MEASURE: CPT | Performed by: PHYSICIAN ASSISTANT

## 2023-12-29 PROCEDURE — 3074F PR MOST RECENT SYSTOLIC BLOOD PRESSURE < 130 MM HG: ICD-10-PCS | Mod: CPTII,S$GLB,, | Performed by: PHYSICIAN ASSISTANT

## 2023-12-29 PROCEDURE — 3008F BODY MASS INDEX DOCD: CPT | Mod: CPTII,S$GLB,, | Performed by: PHYSICIAN ASSISTANT

## 2023-12-29 PROCEDURE — 1160F RVW MEDS BY RX/DR IN RCRD: CPT | Mod: CPTII,S$GLB,, | Performed by: PHYSICIAN ASSISTANT

## 2023-12-29 PROCEDURE — 3074F SYST BP LT 130 MM HG: CPT | Mod: CPTII,S$GLB,, | Performed by: PHYSICIAN ASSISTANT

## 2023-12-29 PROCEDURE — 99999 PR PBB SHADOW E&M-EST. PATIENT-LVL III: ICD-10-PCS | Mod: PBBFAC,,, | Performed by: PHYSICIAN ASSISTANT

## 2023-12-29 PROCEDURE — 99213 PR OFFICE/OUTPT VISIT, EST, LEVL III, 20-29 MIN: ICD-10-PCS | Mod: S$GLB,,, | Performed by: PHYSICIAN ASSISTANT

## 2023-12-29 NOTE — PROGRESS NOTES
Subjective:      Patient ID: Andree Zavala is a 18 y.o. female.    Chief Complaint: Annual Exam (Coming in for annual visit and needs physical done for school.)    Patient is new to me, being seen today for school physical     Followed by Neuro for numbness/tingling of tongue/face, work up pending (possibly MS)  Denies movement being affected     Followed by external Psych for bipolar disorder, stable on current meds     Starting nursing school at [employer]      Review of Systems   Constitutional:  Negative for chills, diaphoresis and fever.   HENT:  Negative for congestion, rhinorrhea and sore throat.    Respiratory:  Negative for cough, shortness of breath and wheezing.    Cardiovascular:  Negative for chest pain.   Gastrointestinal:  Negative for abdominal pain, constipation, diarrhea, nausea and vomiting.   Musculoskeletal:  Negative for myalgias.   Skin:  Negative for rash.   Neurological:  Positive for numbness. Negative for dizziness, light-headedness and headaches.   Psychiatric/Behavioral:  Negative for dysphoric mood. The patient is not nervous/anxious.        Objective:   /78 (BP Location: Left arm, Patient Position: Sitting, BP Method: Medium (Manual))   Pulse 98   Temp 97.1 °F (36.2 °C)   Resp 16   Wt 51 kg (112 lb 7 oz)   SpO2 98%   BMI 18.29 kg/m²   Physical Exam  Constitutional:       General: She is not in acute distress.     Appearance: Normal appearance. She is well-developed. She is not ill-appearing.   HENT:      Head: Normocephalic and atraumatic.      Right Ear: Hearing, tympanic membrane, ear canal and external ear normal.      Left Ear: Hearing, tympanic membrane, ear canal and external ear normal.      Nose: Nose normal. No mucosal edema.      Mouth/Throat:      Pharynx: Uvula midline. No posterior oropharyngeal erythema.   Eyes:      General: Lids are normal.      Pupils: Pupils are equal, round, and reactive to light.   Cardiovascular:      Rate and Rhythm: Normal rate and  regular rhythm.      Heart sounds: Normal heart sounds. No murmur heard.  Pulmonary:      Effort: Pulmonary effort is normal. No respiratory distress.      Breath sounds: Normal breath sounds. No decreased breath sounds, wheezing, rhonchi or rales.   Abdominal:      General: Bowel sounds are normal. There is no distension.      Palpations: Abdomen is soft.      Tenderness: There is no abdominal tenderness.   Skin:     General: Skin is warm and dry.      Findings: No rash.   Neurological:      Mental Status: She is alert and oriented to person, place, and time.      Cranial Nerves: No cranial nerve deficit.      Sensory: No sensory deficit.      Gait: Gait normal.      Comments: CN 3, 4, 6, 7, 11 and 12 checked and intact   Psychiatric:         Speech: Speech normal.         Behavior: Behavior normal.         Thought Content: Thought content normal.       Assessment:      1. School physical exam       Plan:   School physical exam  -     QUANTIFERON GOLD TB; Future; Expected date: 12/29/2023  -     Hepatitis B Surface Antibody, Qual/Quant; Future; Expected date: 12/29/2023      Should neuro conditions require any future accommodations for school, recommend discuss with Neuro     F/u PCP to establish care at [place]

## 2023-12-30 LAB
GAMMA INTERFERON BACKGROUND BLD IA-ACNC: 0.03 IU/ML
M TB IFN-G CD4+ BCKGRND COR BLD-ACNC: -0.01 IU/ML
M TB IFN-G CD4+ BCKGRND COR BLD-ACNC: 0.01 IU/ML
MITOGEN IGNF BCKGRD COR BLD-ACNC: 9.97 IU/ML
TB GOLD PLUS: NEGATIVE

## 2024-01-04 ENCOUNTER — PATIENT MESSAGE (OUTPATIENT)
Dept: INTERNAL MEDICINE | Facility: CLINIC | Age: 19
End: 2024-01-04
Payer: COMMERCIAL

## 2024-01-05 LAB
HBV SURFACE AB SER QL IA: NEGATIVE
HBV SURFACE AB SERPL IA-ACNC: 4 MIU/ML

## 2024-01-22 ENCOUNTER — OFFICE VISIT (OUTPATIENT)
Dept: NEUROLOGY | Facility: CLINIC | Age: 19
End: 2024-01-22
Payer: COMMERCIAL

## 2024-01-22 VITALS
DIASTOLIC BLOOD PRESSURE: 82 MMHG | WEIGHT: 112.19 LBS | HEART RATE: 88 BPM | HEIGHT: 65 IN | SYSTOLIC BLOOD PRESSURE: 137 MMHG | BODY MASS INDEX: 18.69 KG/M2

## 2024-01-22 DIAGNOSIS — G35 MULTIPLE SCLEROSIS, RELAPSING-REMITTING: Primary | ICD-10-CM

## 2024-01-22 PROCEDURE — 3008F BODY MASS INDEX DOCD: CPT | Mod: CPTII,S$GLB,, | Performed by: PSYCHIATRY & NEUROLOGY

## 2024-01-22 PROCEDURE — 3079F DIAST BP 80-89 MM HG: CPT | Mod: CPTII,S$GLB,, | Performed by: PSYCHIATRY & NEUROLOGY

## 2024-01-22 PROCEDURE — 1159F MED LIST DOCD IN RCRD: CPT | Mod: CPTII,S$GLB,, | Performed by: PSYCHIATRY & NEUROLOGY

## 2024-01-22 PROCEDURE — 1160F RVW MEDS BY RX/DR IN RCRD: CPT | Mod: CPTII,S$GLB,, | Performed by: PSYCHIATRY & NEUROLOGY

## 2024-01-22 PROCEDURE — 99999 PR PBB SHADOW E&M-EST. PATIENT-LVL IV: CPT | Mod: PBBFAC,,, | Performed by: PSYCHIATRY & NEUROLOGY

## 2024-01-22 PROCEDURE — 3075F SYST BP GE 130 - 139MM HG: CPT | Mod: CPTII,S$GLB,, | Performed by: PSYCHIATRY & NEUROLOGY

## 2024-01-22 PROCEDURE — 99213 OFFICE O/P EST LOW 20 MIN: CPT | Mod: S$GLB,,, | Performed by: PSYCHIATRY & NEUROLOGY

## 2024-01-22 RX ORDER — DIMETHYL FUMARATE 120 MG/1
120 CAPSULE ORAL 2 TIMES DAILY
Qty: 28 CAPSULE | Refills: 0 | Status: SHIPPED | OUTPATIENT
Start: 2024-01-22 | End: 2024-02-05

## 2024-01-22 RX ORDER — DIMETHYL FUMARATE 240 MG/1
240 CAPSULE ORAL 2 TIMES DAILY
Qty: 60 CAPSULE | Refills: 3 | Status: SHIPPED | OUTPATIENT
Start: 2024-02-05 | End: 2024-02-12 | Stop reason: SDUPTHER

## 2024-01-22 NOTE — PROGRESS NOTES
Subjective:       Patient ID: Andree Zavala is a 18 y.o. female.    Chief Complaint: CNS demyelinating disease        HPI    The patient presented on November 2023 for evaluation of Sensory symptoms.   New issues: none.   Symptoms: Numbness resolved.   No signs and symptoms of flare up.     Review of Systems   All other systems reviewed and are negative.            Current Outpatient Medications:     norelgestromin-ethinyl estradiol (XULANE) 150-35 mcg/24 hr, APPLY 1 PATCH TOPICALLY TO THE SKIN 1 TIME A WEEK, Disp: 3 patch, Rfl: 11    buPROPion (WELLBUTRIN XL) 150 MG TB24 tablet, Take 150 mg by mouth., Disp: , Rfl:     ibuprofen (ADVIL,MOTRIN) 600 MG tablet, Take 600 mg by mouth., Disp: , Rfl:     lamoTRIgine (LAMICTAL) 150 MG Tab, Take 150 mg by mouth., Disp: , Rfl:     traZODone (DESYREL) 50 MG tablet, Take 50 mg by mouth every evening., Disp: , Rfl:     Past Medical History:   Diagnosis Date    Asthma     Bipolar disorder, unspecified        Past Surgical History:   Procedure Laterality Date    ADENOIDECTOMY      TONSILLECTOMY         Social History     Socioeconomic History    Marital status: Single   Tobacco Use    Smoking status: Never     Passive exposure: Never    Smokeless tobacco: Never   Substance and Sexual Activity    Alcohol use: No    Drug use: Never    Sexual activity: Yes     Partners: Male     Birth control/protection: Condom, Patch     Comment: Birth control pills     Social Determinants of Health     Financial Resource Strain: Low Risk  (12/29/2023)    Overall Financial Resource Strain (CARDIA)     Difficulty of Paying Living Expenses: Not very hard   Food Insecurity: No Food Insecurity (12/29/2023)    Hunger Vital Sign     Worried About Running Out of Food in the Last Year: Never true     Ran Out of Food in the Last Year: Never true   Transportation Needs: No Transportation Needs (12/29/2023)    PRAPARE - Transportation     Lack of Transportation (Medical): No     Lack of Transportation  (Non-Medical): No   Physical Activity: Inactive (12/29/2023)    Exercise Vital Sign     Days of Exercise per Week: 0 days     Minutes of Exercise per Session: 0 min   Stress: No Stress Concern Present (12/29/2023)    [employer] - Occupational Stress Questionnaire     Feeling of Stress : Only a little   Social Connections: Unknown (12/29/2023)    Social Connection and Isolation Panel [NHANES]     Frequency of Communication with Friends and Family: More than three times a week     Frequency of Social Gatherings with Friends and Family: Once a week     Active Member of Clubs or Organizations: No     Attends Club or Organization Meetings: Patient declined     Marital Status: Living with partner   Housing Stability: Low Risk  (12/29/2023)    Housing Stability Vital Sign     Unable to Pay for Housing in the Last Year: No     Number of Places Lived in the Last Year: 2     Unstable Housing in the Last Year: No       Past/Current Medical/Surgical History, Past/Current Social History, Past/Current Family History and Past/Current Medications were reviewed in detail.      Objective:     VITAL SIGNS WERE REVIEWED      GENERAL APPEARANCE:     The patient looks comfortable.    BMI    No signs of respiratory distress.    Normal breathing pattern.    No dysmorphic features    Normal eye contact.       GENERAL MEDICAL EXAM:    HEENT:  Head is atraumatic normocephalic.     FUNDOSCOPIC (OPHTHALMOSCOPIC) EXAMINATION showed no disc edema (papilledema).      NECK: No JVD. No visible lesions or goiters.     CHEST-CARDIOPULMONARY: No cyanosis. No tachypnea. Normal respiratory effort.    [id number]: No jaundice. No stomas or lesions. No visible hernias. No catheters.     SKIN, HAIR, NAILS: No pathognomonic skin rash.No neurofibromatosis. No visible lesions.No stigmata of autoimmune disease. No clubbing.    LIMBS: No varicose veins. No visible swelling.    MUSCULOSKELETAL: No visible  deformities.No visible lesions.    Neurologic Exam     Mental Status   Oriented to person, place, and time.   Registration: recalls 3 of 3 objects. Recall at 5 minutes: recalls 3 of 3 objects. Follows 3 step commands.   Attention: normal. Concentration: normal.   Speech: speech is normal   Level of consciousness: alert  Knowledge: good. Able to perform simple calculations.   Able to name object. Able to read. Able to repeat. Able to write. Normal comprehension.     Cranial Nerves   Cranial nerves II through XII intact.     Motor Exam   Muscle bulk: normal  Overall muscle tone: normal    Strength   Strength 5/5 throughout.   Right neck flexion: 5/5  Left neck flexion: 5/5  Right neck extension: 5/5  Left neck extension: 5/5  Right deltoid: 5/5  Left deltoid: 5/5  Right biceps: 5/5  Left biceps: 5/5  Right triceps: 5/5  Left triceps: 5/5  Right wrist flexion: 5/5  Left wrist flexion: 5/5  Right wrist extension: 5/5  Left wrist extension: 5/5  Right interossei: 5/5  Left interossei: 5/5  Right abdominals: 5/5  Left abdominals: 5/5  Right iliopsoas: 5/5  Left iliopsoas: 5/5  Right quadriceps: 5/5  Left quadriceps: 5/5  Right hamstrin/5  Left hamstrin/5  Right glutei: 5/5  Left glutei: 5/5  Right anterior tibial: 5/5  Left anterior tibial: 5/5  Right posterior tibial: 5/5  Left posterior tibial: 5/5  Right peroneal: 5/5  Left peroneal: 5/5  Right gastroc: 5/5  Left gastroc: 5/5    Sensory Exam   Light touch normal.   Vibration normal.   Proprioception normal.   Pinprick normal.   Graphesthesia: normal  Stereognosis: normal    Gait, Coordination, and Reflexes     Gait  Gait: normal    Coordination   Romberg: negative  Finger to nose coordination: normal  Heel to shin coordination: normal  Tandem walking coordination: normal    Tremor   Resting tremor: absent  Intention tremor: absent  Action tremor: absent    Reflexes   Right brachioradialis: 2+  Left brachioradialis: 2+  Right biceps: 2+  Left biceps: 2+  Right  triceps: 2+  Left triceps: 2+  Right patellar: 2+  Left patellar: 2+  Right achilles: 2+  Left achilles: 2+  Right : 2+  Left : 2+  Right plantar: normal  Left plantar: normal  Right Gamez: absent  Left Gamez: absent  Right ankle clonus: absent  Left ankle clonus: absent  Right pendular knee jerk: absent  Left pendular knee jerk: absent        Lab Results   Component Value Date    WBC 6.10 10/14/2023    HGB 12.1 10/14/2023    HCT 37.3 10/14/2023    MCV 88 10/14/2023     10/14/2023       Sodium   Date Value Ref Range Status   10/14/2023 138 136 - 145 mmol/L Final     Potassium   Date Value Ref Range Status   10/14/2023 4.8 3.5 - 5.1 mmol/L Final     Chloride   Date Value Ref Range Status   10/14/2023 109 95 - 110 mmol/L Final     CO2   Date Value Ref Range Status   10/14/2023 21 (L) 23 - 29 mmol/L Final     Glucose   Date Value Ref Range Status   10/14/2023 98 70 - 110 mg/dL Final     BUN   Date Value Ref Range Status   10/14/2023 11 6 - 20 mg/dL Final     Creatinine   Date Value Ref Range Status   11/03/2023 0.7 0.5 - 1.4 mg/dL Final     Calcium   Date Value Ref Range Status   10/14/2023 9.6 8.7 - 10.5 mg/dL Final     Total Protein   Date Value Ref Range Status   10/14/2023 8.1 6.0 - 8.4 g/dL Final     Albumin   Date Value Ref Range Status   10/14/2023 4.0 3.5 - 5.2 g/dL Final     Total Bilirubin   Date Value Ref Range Status   10/14/2023 0.3 0.1 - 1.0 mg/dL Final     Comment:     For infants and newborns, interpretation of results should be based  on gestational age, weight and in agreement with clinical  observations.    Premature Infant recommended reference ranges:  Up to 24 hours.............<8.0 mg/dL  Up to 48 hours............<12.0 mg/dL  3-5 days..................<15.0 mg/dL  6-29 days.................<15.0 mg/dL       Alkaline Phosphatase   Date Value Ref Range Status   10/14/2023 64 55 - 135 U/L Final     AST   Date Value Ref Range Status   10/14/2023 14 10 - 40 U/L Final     ALT   Date  Value Ref Range Status   10/14/2023 10 10 - 44 U/L Final     Anion Gap   Date Value Ref Range Status   10/14/2023 8 8 - 16 mmol/L Final     eGFR if    Date Value Ref Range Status   02/17/2016 SEE COMMENT >60 mL/min/1.73 m^2 Final     eGFR if non    Date Value Ref Range Status   02/17/2016 SEE COMMENT >60 mL/min/1.73 m^2 Final     Comment:     Calculation used to obtain the estimated glomerular filtration  rate (eGFR) is the CKD-EPI equation. Since race is unknown   in our information system, the eGFR values for   -American and Non--American patients are given   for each creatinine result.  Test not performed.  GFR calculation is only valid for patients   18 and older.         Lab Results   Component Value Date    [id number] 472 10/14/2023       Lab Results   Component Value Date    TSH 2.470 10/14/2023    FREET4 0.87 10/14/2023       No results found in the last 24 hours.    LABORATORY EVALUATION      RADIOLOGY EVALUATION       NEUROPHYSIOLOGY EVALUATION       PATHOLOGY EVALUATION        NEUROCOGNITIVE AND NEUROPSYCHOLOGY EVALUATION     Reviewed the neuroimaging independently     Assessment:   Patient Neurological Assessment is non focal.   - Multiple Sclerosis.   - Demyelinating disease.  - Depression Ds.   - Anxiety Ds.       Plan:   Patient indicated no much symptoms - numbness has improved to nil at this moment.   No signs or symptoms of clinica flare up.     NMOSD antibodies.  LP and CSF studies for MS.  OLIGS band - CSF and serum.   MS mimic test.   Start Tecfidera.  LFT's: normal ( 10/ 2023 )   Hep B Surface ab - 4 ( < 10 ).   Side effects discussed - GI symptoms ( Nausea / Vomiting / Diarrhea ) / infections / Angioedema / Acute pancreatitis and so on.   Referral to MS specialist.       Personally reviewed MRI Brain / Cervical / Thoracic spines W and W/ o contrast - done  / 2023 - demyelinating lesions ( Brain / Cervical / Thoracic spine )   typical pod MS.    Labs: CBC / CMP / RPR / TSH / Free T 4 / Pregnancy test / Hgb A 1 C / Lipids panel done 10/ 2023 - non significant abnormalities.        MEDICAL/SURGICAL COMORBIDITIES     All relevant medical comorbidities noted and managed by primary care physician and medical care team.        HEALTHY LIFESTYLE AND PREVENTATIVE CARE    The patient to adhere to the age-appropriate health maintenance guidelines including screening tests and vaccinations. The patient to adhere to  healthy lifestyle, optimal weight, exercise, healthy diet, good sleep hygiene and avoiding drugs including smoking, alcohol and recreational drugs.      Follow up in about 2 months (around 3/22/2024).    Jeff Sumner MD    General Neurology

## 2024-01-23 ENCOUNTER — TELEPHONE (OUTPATIENT)
Dept: NEUROLOGY | Facility: CLINIC | Age: 19
End: 2024-01-23
Payer: COMMERCIAL

## 2024-02-04 ENCOUNTER — PATIENT MESSAGE (OUTPATIENT)
Dept: NEUROLOGY | Facility: CLINIC | Age: 19
End: 2024-02-04
Payer: COMMERCIAL

## 2024-02-05 DIAGNOSIS — D89.89 CFIDS (CHRONIC FATIGUE AND IMMUNE DYSFUNCTION SYNDROME): Primary | ICD-10-CM

## 2024-02-05 DIAGNOSIS — G35 MULTIPLE SCLEROSIS, RELAPSING-REMITTING: ICD-10-CM

## 2024-02-05 DIAGNOSIS — G93.32 CFIDS (CHRONIC FATIGUE AND IMMUNE DYSFUNCTION SYNDROME): Primary | ICD-10-CM

## 2024-02-05 RX ORDER — DEXTROAMPHETAMINE SACCHARATE, AMPHETAMINE ASPARTATE, DEXTROAMPHETAMINE SULFATE AND AMPHETAMINE SULFATE 2.5; 2.5; 2.5; 2.5 MG/1; MG/1; MG/1; MG/1
10 TABLET ORAL 2 TIMES DAILY
Qty: 60 TABLET | Refills: 0 | Status: SHIPPED | OUTPATIENT
Start: 2024-02-05 | End: 2024-02-06 | Stop reason: CLARIF

## 2024-02-06 DIAGNOSIS — G47.429 NARCOLEPSY DUE TO UNDERLYING CONDITION WITHOUT CATAPLEXY: Primary | ICD-10-CM

## 2024-02-06 RX ORDER — DEXTROAMPHETAMINE SACCHARATE, AMPHETAMINE ASPARTATE, DEXTROAMPHETAMINE SULFATE AND AMPHETAMINE SULFATE 2.5; 2.5; 2.5; 2.5 MG/1; MG/1; MG/1; MG/1
10 TABLET ORAL 2 TIMES DAILY
Qty: 60 TABLET | Refills: 0 | Status: SHIPPED | OUTPATIENT
Start: 2024-02-06 | End: 2024-02-09 | Stop reason: SDUPTHER

## 2024-02-09 ENCOUNTER — OFFICE VISIT (OUTPATIENT)
Dept: NEUROLOGY | Facility: CLINIC | Age: 19
End: 2024-02-09
Payer: COMMERCIAL

## 2024-02-09 VITALS
HEART RATE: 88 BPM | WEIGHT: 117.31 LBS | HEIGHT: 65 IN | BODY MASS INDEX: 19.54 KG/M2 | SYSTOLIC BLOOD PRESSURE: 134 MMHG | DIASTOLIC BLOOD PRESSURE: 68 MMHG

## 2024-02-09 DIAGNOSIS — G47.19 EXCESSIVE DAYTIME SLEEPINESS: ICD-10-CM

## 2024-02-09 DIAGNOSIS — G47.429 NARCOLEPSY DUE TO UNDERLYING CONDITION WITHOUT CATAPLEXY: Primary | ICD-10-CM

## 2024-02-09 PROCEDURE — 3008F BODY MASS INDEX DOCD: CPT | Mod: CPTII,S$GLB,, | Performed by: PSYCHIATRY & NEUROLOGY

## 2024-02-09 PROCEDURE — 1159F MED LIST DOCD IN RCRD: CPT | Mod: CPTII,S$GLB,, | Performed by: PSYCHIATRY & NEUROLOGY

## 2024-02-09 PROCEDURE — 3078F DIAST BP <80 MM HG: CPT | Mod: CPTII,S$GLB,, | Performed by: PSYCHIATRY & NEUROLOGY

## 2024-02-09 PROCEDURE — 99214 OFFICE O/P EST MOD 30 MIN: CPT | Mod: S$GLB,,, | Performed by: PSYCHIATRY & NEUROLOGY

## 2024-02-09 PROCEDURE — 3075F SYST BP GE 130 - 139MM HG: CPT | Mod: CPTII,S$GLB,, | Performed by: PSYCHIATRY & NEUROLOGY

## 2024-02-09 PROCEDURE — 99999 PR PBB SHADOW E&M-EST. PATIENT-LVL III: CPT | Mod: PBBFAC,,, | Performed by: PSYCHIATRY & NEUROLOGY

## 2024-02-09 PROCEDURE — 1160F RVW MEDS BY RX/DR IN RCRD: CPT | Mod: CPTII,S$GLB,, | Performed by: PSYCHIATRY & NEUROLOGY

## 2024-02-09 RX ORDER — DEXTROAMPHETAMINE SACCHARATE, AMPHETAMINE ASPARTATE, DEXTROAMPHETAMINE SULFATE AND AMPHETAMINE SULFATE 1.25; 1.25; 1.25; 1.25 MG/1; MG/1; MG/1; MG/1
5 TABLET ORAL 2 TIMES DAILY
Qty: 60 TABLET | Refills: 0 | Status: SHIPPED | OUTPATIENT
Start: 2024-02-09 | End: 2024-02-21 | Stop reason: RX

## 2024-02-09 NOTE — H&P (VIEW-ONLY)
"Subjective:       Patient ID: Andree Zavala is a 18 y.o. female.    Chief Complaint: " tiredness "       HPI    The patient presented on 01/ 2024 for evaluation of MS.   New issues: " tiredness "     Started: about 6 to 8 months ago.   Describes: " falling asleep very easy " during the day.   Timing: seconds to minutes.   Frequency: daily.   Pain:  0 to 4/ 10.    Location: [facility].   Family: positive Autoimmune Ds.   Medications: Tecfidera.   Worsen: During the day.   Alleviated: short naps.   Associated symptoms: brief hallucinations while falling asleep /  few episodes of " sensation of no able to move " wake up when falling asleep.     Triggers: sometimes after eating.   Prodrome symptoms: none.          Upon questioning She denied sudden lost of muscles strength or control trigger by high emotions.     Review of Systems   Neurological:  Positive for dizziness and numbness.        Sleepiness.    All other systems reviewed and are negative.            Current Outpatient Medications:     dextroamphetamine-amphetamine (ADDERALL) 10 mg Tab, Take 1 tablet (10 mg total) by mouth 2 (two) times a day., Disp: 60 tablet, Rfl: 0    dimethyl fumarate 240 mg CpDR, Take 240 mg by mouth 2 (two) times daily., Disp: 60 capsule, Rfl: 3    norelgestromin-ethinyl estradiol (XULANE) 150-35 mcg/24 hr, APPLY 1 PATCH TOPICALLY TO THE SKIN 1 TIME A WEEK, Disp: 3 patch, Rfl: 11    Past Medical History:   Diagnosis Date    Asthma     Bipolar disorder, unspecified        Past Surgical History:   Procedure Laterality Date    ADENOIDECTOMY      TONSILLECTOMY         Social History     Socioeconomic History    Marital status: Single   Tobacco Use    Smoking status: Never     Passive exposure: Never    Smokeless tobacco: Never   Substance and Sexual Activity    Alcohol use: No    Drug use: Never    Sexual activity: Yes     Partners: Male     Birth control/protection: Condom, Patch     Comment: Birth control pills     Social Determinants of " Health     Financial Resource Strain: Low Risk  (12/29/2023)    Overall Financial Resource Strain (CARDIA)     Difficulty of Paying Living Expenses: Not very hard   Food Insecurity: No Food Insecurity (12/29/2023)    Hunger Vital Sign     Worried About Running Out of Food in the Last Year: Never true     Ran Out of Food in the Last Year: Never true   Transportation Needs: No Transportation Needs (12/29/2023)    PRAPARE - Transportation     Lack of Transportation (Medical): No     Lack of Transportation (Non-Medical): No   Physical Activity: Inactive (12/29/2023)    Exercise Vital Sign     Days of Exercise per Week: 0 days     Minutes of Exercise per Session: 0 min   Stress: No Stress Concern Present (12/29/2023)    [employer] [place] [employer] - Occupational Stress Questionnaire     Feeling of Stress : Only a little   Social Connections: Unknown (12/29/2023)    Social Connection and Isolation Panel [NHANES]     Frequency of Communication with Friends and Family: More than three times a week     Frequency of Social Gatherings with Friends and Family: Once a week     Active Member of Clubs or Organizations: No     Attends Club or Organization Meetings: Patient declined     Marital Status: Living with partner   Housing Stability: Low Risk  (12/29/2023)    Housing Stability Vital Sign     Unable to Pay for Housing in the Last Year: No     Number of Places Lived in the Last Year: 2     Unstable Housing in the Last Year: No       Past/Current Medical/Surgical History, Past/Current Social History, Past/Current Family History and Past/Current Medications were reviewed in detail.      Objective:     VITAL SIGNS WERE REVIEWED      GENERAL APPEARANCE:     The patient looks comfortable.    BMI    No signs of respiratory distress.    Normal breathing pattern.    No dysmorphic features    Normal eye contact.       GENERAL MEDICAL EXAM:    HEENT:  Head is atraumatic normocephalic.     FUNDOSCOPIC (OPHTHALMOSCOPIC)  EXAMINATION showed no disc edema (papilledema).      NECK: No JVD. No visible lesions or goiters.     CHEST-CARDIOPULMONARY: No cyanosis. No tachypnea. Normal respiratory effort.    [id number]: No jaundice. No stomas or lesions. No visible hernias. No catheters.     SKIN, HAIR, NAILS: No pathognomonic skin rash.No neurofibromatosis.   No visible lesions.No stigmata of autoimmune disease. No clubbing.    LIMBS: No varicose veins. No visible swelling.    MUSCULOSKELETAL: No visible deformities.No visible lesions.      Neurologic Exam     Mental Status   Oriented to person, place, and time.   Registration: recalls 3 of 3 objects. Recall at 5 minutes: recalls 3 of 3 objects. Follows 3 step commands.   Attention: normal. Concentration: normal.   Speech: speech is normal   Level of consciousness: alert  Knowledge: good. Able to perform simple calculations.   Able to name object. Able to read. Able to repeat. Able to write. Normal comprehension.     Cranial Nerves     CN II   Visual fields full to confrontation.     CN III, IV, VI   Pupils are equal, round, and reactive to light.  Extraocular motions are normal.   Upgaze: normal  Downgaze: normal  Conjugate gaze: present  Vestibulo-ocular reflex: present    CN V   Facial sensation intact.     CN VII   Facial expression full, symmetric.     CN VIII   CN VIII normal.     CN IX, X   CN IX normal.   CN X normal.     CN XI   CN XI normal.     CN XII   CN XII normal.     Motor Exam   Muscle bulk: normal  Overall muscle tone: normal    Strength   Strength 5/5 throughout.   Right neck flexion: 5/5  Left neck flexion: 5/5  Right neck extension: 5/5  Left neck extension: 5/5  Right deltoid: 5/5  Left deltoid: 5/5  Right biceps: 5/5  Left biceps: 5/5  Right triceps: 5/5  Left triceps: 5/5  Right wrist flexion: 5/5  Left wrist flexion: 5/5  Right wrist extension: 5/5  Left wrist extension: 5/5  Right interossei: 5/5  Left interossei: 5/5  Right abdominals:  5/5  Left abdominals: 5/5  Right iliopsoas: 5/5  Left iliopsoas: 5/5  Right quadriceps: 5/5  Left quadriceps: 5/5  Right hamstrin/5  Left hamstrin/5  Right glutei: 5/5  Left glutei: 5/5  Right anterior tibial: 5/5  Left anterior tibial: 5/5  Right posterior tibial: 5/5  Left posterior tibial: 5/5  Right peroneal: 5/5  Left peroneal: 5/5  Right gastroc: 5/5  Left gastroc: 5/5    Sensory Exam   Light touch normal.   Vibration normal.   Proprioception normal.   Pinprick normal.   Graphesthesia: normal  Stereognosis: normal    Gait, Coordination, and Reflexes     Gait  Gait: normal    Coordination   Romberg: negative  Finger to nose coordination: normal  Heel to shin coordination: normal  Tandem walking coordination: normal    Tremor   Resting tremor: absent  Intention tremor: absent  Action tremor: absent    Reflexes   Right brachioradialis: 2+  Left brachioradialis: 2+  Right biceps: 2+  Left biceps: 2+  Right triceps: 2+  Left triceps: 2+  Right patellar: 2+  Left patellar: 2+  Right achilles: 2+  Left achilles: 2+  Right : 2+  Left : 2+  Right plantar: normal  Left plantar: normal  Right Gamez: absent  Left Gamez: absent  Right ankle clonus: absent  Left ankle clonus: absent  Right pendular knee jerk: absent  Left pendular knee jerk: absent        Lab Results   Component Value Date    WBC 6.10 10/14/2023    HGB 12.1 10/14/2023    HCT 37.3 10/14/2023    MCV 88 10/14/2023     10/14/2023       Sodium   Date Value Ref Range Status   10/14/2023 138 136 - 145 mmol/L Final     Potassium   Date Value Ref Range Status   10/14/2023 4.8 3.5 - 5.1 mmol/L Final     Chloride   Date Value Ref Range Status   10/14/2023 109 95 - 110 mmol/L Final     CO2   Date Value Ref Range Status   10/14/2023 21 (L) 23 - 29 mmol/L Final     Glucose   Date Value Ref Range Status   10/14/2023 98 70 - 110 mg/dL Final     BUN   Date Value Ref Range Status   10/14/2023 11 6 - 20 mg/dL Final     Creatinine   Date Value Ref  Range Status   11/03/2023 0.7 0.5 - 1.4 mg/dL Final     Calcium   Date Value Ref Range Status   10/14/2023 9.6 8.7 - 10.5 mg/dL Final     Total Protein   Date Value Ref Range Status   10/14/2023 8.1 6.0 - 8.4 g/dL Final     Albumin   Date Value Ref Range Status   10/14/2023 4.0 3.5 - 5.2 g/dL Final     Total Bilirubin   Date Value Ref Range Status   10/14/2023 0.3 0.1 - 1.0 mg/dL Final     Comment:     For infants and newborns, interpretation of results should be based  on gestational age, weight and in agreement with clinical  observations.    Premature Infant recommended reference ranges:  Up to 24 hours.............<8.0 mg/dL  Up to 48 hours............<12.0 mg/dL  3-5 days..................<15.0 mg/dL  6-29 days.................<15.0 mg/dL       Alkaline Phosphatase   Date Value Ref Range Status   10/14/2023 64 55 - 135 U/L Final     AST   Date Value Ref Range Status   10/14/2023 14 10 - 40 U/L Final     ALT   Date Value Ref Range Status   10/14/2023 10 10 - 44 U/L Final     Anion Gap   Date Value Ref Range Status   10/14/2023 8 8 - 16 mmol/L Final     eGFR if    Date Value Ref Range Status   02/17/2016 SEE COMMENT >60 mL/min/1.73 m^2 Final     eGFR if non    Date Value Ref Range Status   02/17/2016 SEE COMMENT >60 mL/min/1.73 m^2 Final     Comment:     Calculation used to obtain the estimated glomerular filtration  rate (eGFR) is the CKD-EPI equation. Since race is unknown   in our information system, the eGFR values for   -American and Non--American patients are given   for each creatinine result.  Test not performed.  GFR calculation is only valid for patients   18 and older.         Lab Results   Component Value Date    [id number] 472 10/14/2023       Lab Results   Component Value Date    TSH 2.470 10/14/2023    FREET4 0.87 10/14/2023       No results found in the last 24 hours.    No results found in the last 24 hours.      LABORATORY EVALUATION      RADIOLOGY  EVALUATION       NEUROPHYSIOLOGY EVALUATION       PATHOLOGY EVALUATION        NEUROCOGNITIVE AND NEUROPSYCHOLOGY EVALUATION       Reviewed the neuroimaging independently     Assessment:   Patient Neurological Assessment is non focal.   - Multiple Sclerosis.   - Narcolepsy W/ o Cataplexy.      Plan:   Patient symptoms ( Sleep Paralysis / Hypnagogic Hallucinations ) are typical of Narcolepsy /  appears no having Cataplexia according to Her Hx.     Start Adderall 5 mg PO BID.     Still pending - LP / CSF studies for MS work up.       NMOSD antibodies.  LP and CSF studies for MS - pending.    Personally reviewed MRI Brain / Cervical / Thoracic spines W and W/ o contrast - done  / 2023 - demyelinating lesions ( Brain / Cervical / Thoracic spine )   typical for MS.   Labs: CBC / CMP / RPR / TSH / Free T 4 / Pregnancy test / Hgb A 1 C / Lipids panel done 10/ 2023 - non significant abnormalities.      MEDICAL/SURGICAL COMORBIDITIES     All relevant medical comorbidities noted and managed by primary care physician and medical care team.        HEALTHY LIFESTYLE AND PREVENTATIVE CARE    The patient to adhere to the age-appropriate health maintenance guidelines including screening tests and vaccinations.   The patient to adhere to  healthy lifestyle, optimal weight, exercise, healthy diet, good sleep hygiene and avoiding drugs including smoking, alcohol and recreational drugs.      No follow-ups on file.    Jeff Sumner MD    General Neurology

## 2024-02-09 NOTE — PROGRESS NOTES
"Subjective:       Patient ID: Andree Zavala is a 18 y.o. female.    Chief Complaint: " tiredness "       HPI    The patient presented on 01/ 2024 for evaluation of MS.   New issues: " tiredness "     Started: about 6 to 8 months ago.   Describes: " falling asleep very easy " during the day.   Timing: seconds to minutes.   Frequency: daily.   Pain:  0 to 4/ 10.    Location: [facility].   Family: positive Autoimmune Ds.   Medications: Tecfidera.   Worsen: During the day.   Alleviated: short naps.   Associated symptoms: brief hallucinations while falling asleep /  few episodes of " sensation of no able to move " wake up when falling asleep.     Triggers: sometimes after eating.   Prodrome symptoms: none.          Upon questioning She denied sudden lost of muscles strength or control trigger by high emotions.     Review of Systems   Neurological:  Positive for dizziness and numbness.        Sleepiness.    All other systems reviewed and are negative.            Current Outpatient Medications:     dextroamphetamine-amphetamine (ADDERALL) 10 mg Tab, Take 1 tablet (10 mg total) by mouth 2 (two) times a day., Disp: 60 tablet, Rfl: 0    dimethyl fumarate 240 mg CpDR, Take 240 mg by mouth 2 (two) times daily., Disp: 60 capsule, Rfl: 3    norelgestromin-ethinyl estradiol (XULANE) 150-35 mcg/24 hr, APPLY 1 PATCH TOPICALLY TO THE SKIN 1 TIME A WEEK, Disp: 3 patch, Rfl: 11    Past Medical History:   Diagnosis Date    Asthma     Bipolar disorder, unspecified        Past Surgical History:   Procedure Laterality Date    ADENOIDECTOMY      TONSILLECTOMY         Social History     Socioeconomic History    Marital status: Single   Tobacco Use    Smoking status: Never     Passive exposure: Never    Smokeless tobacco: Never   Substance and Sexual Activity    Alcohol use: No    Drug use: Never    Sexual activity: Yes     Partners: Male     Birth control/protection: Condom, Patch     Comment: Birth control pills     Social Determinants of " Health     Financial Resource Strain: Low Risk  (12/29/2023)    Overall Financial Resource Strain (CARDIA)     Difficulty of Paying Living Expenses: Not very hard   Food Insecurity: No Food Insecurity (12/29/2023)    Hunger Vital Sign     Worried About Running Out of Food in the Last Year: Never true     Ran Out of Food in the Last Year: Never true   Transportation Needs: No Transportation Needs (12/29/2023)    PRAPARE - Transportation     Lack of Transportation (Medical): No     Lack of Transportation (Non-Medical): No   Physical Activity: Inactive (12/29/2023)    Exercise Vital Sign     Days of Exercise per Week: 0 days     Minutes of Exercise per Session: 0 min   Stress: No Stress Concern Present (12/29/2023)    [employer] [place] [employer] - Occupational Stress Questionnaire     Feeling of Stress : Only a little   Social Connections: Unknown (12/29/2023)    Social Connection and Isolation Panel [NHANES]     Frequency of Communication with Friends and Family: More than three times a week     Frequency of Social Gatherings with Friends and Family: Once a week     Active Member of Clubs or Organizations: No     Attends Club or Organization Meetings: Patient declined     Marital Status: Living with partner   Housing Stability: Low Risk  (12/29/2023)    Housing Stability Vital Sign     Unable to Pay for Housing in the Last Year: No     Number of Places Lived in the Last Year: 2     Unstable Housing in the Last Year: No       Past/Current Medical/Surgical History, Past/Current Social History, Past/Current Family History and Past/Current Medications were reviewed in detail.      Objective:     VITAL SIGNS WERE REVIEWED      GENERAL APPEARANCE:     The patient looks comfortable.    BMI    No signs of respiratory distress.    Normal breathing pattern.    No dysmorphic features    Normal eye contact.       GENERAL MEDICAL EXAM:    HEENT:  Head is atraumatic normocephalic.     FUNDOSCOPIC (OPHTHALMOSCOPIC)  EXAMINATION showed no disc edema (papilledema).      NECK: No JVD. No visible lesions or goiters.     CHEST-CARDIOPULMONARY: No cyanosis. No tachypnea. Normal respiratory effort.    [id number]: No jaundice. No stomas or lesions. No visible hernias. No catheters.     SKIN, HAIR, NAILS: No pathognomonic skin rash.No neurofibromatosis.   No visible lesions.No stigmata of autoimmune disease. No clubbing.    LIMBS: No varicose veins. No visible swelling.    MUSCULOSKELETAL: No visible deformities.No visible lesions.      Neurologic Exam     Mental Status   Oriented to person, place, and time.   Registration: recalls 3 of 3 objects. Recall at 5 minutes: recalls 3 of 3 objects. Follows 3 step commands.   Attention: normal. Concentration: normal.   Speech: speech is normal   Level of consciousness: alert  Knowledge: good. Able to perform simple calculations.   Able to name object. Able to read. Able to repeat. Able to write. Normal comprehension.     Cranial Nerves     CN II   Visual fields full to confrontation.     CN III, IV, VI   Pupils are equal, round, and reactive to light.  Extraocular motions are normal.   Upgaze: normal  Downgaze: normal  Conjugate gaze: present  Vestibulo-ocular reflex: present    CN V   Facial sensation intact.     CN VII   Facial expression full, symmetric.     CN VIII   CN VIII normal.     CN IX, X   CN IX normal.   CN X normal.     CN XI   CN XI normal.     CN XII   CN XII normal.     Motor Exam   Muscle bulk: normal  Overall muscle tone: normal    Strength   Strength 5/5 throughout.   Right neck flexion: 5/5  Left neck flexion: 5/5  Right neck extension: 5/5  Left neck extension: 5/5  Right deltoid: 5/5  Left deltoid: 5/5  Right biceps: 5/5  Left biceps: 5/5  Right triceps: 5/5  Left triceps: 5/5  Right wrist flexion: 5/5  Left wrist flexion: 5/5  Right wrist extension: 5/5  Left wrist extension: 5/5  Right interossei: 5/5  Left interossei: 5/5  Right abdominals:  5/5  Left abdominals: 5/5  Right iliopsoas: 5/5  Left iliopsoas: 5/5  Right quadriceps: 5/5  Left quadriceps: 5/5  Right hamstrin/5  Left hamstrin/5  Right glutei: 5/5  Left glutei: 5/5  Right anterior tibial: 5/5  Left anterior tibial: 5/5  Right posterior tibial: 5/5  Left posterior tibial: 5/5  Right peroneal: 5/5  Left peroneal: 5/5  Right gastroc: 5/5  Left gastroc: 5/5    Sensory Exam   Light touch normal.   Vibration normal.   Proprioception normal.   Pinprick normal.   Graphesthesia: normal  Stereognosis: normal    Gait, Coordination, and Reflexes     Gait  Gait: normal    Coordination   Romberg: negative  Finger to nose coordination: normal  Heel to shin coordination: normal  Tandem walking coordination: normal    Tremor   Resting tremor: absent  Intention tremor: absent  Action tremor: absent    Reflexes   Right brachioradialis: 2+  Left brachioradialis: 2+  Right biceps: 2+  Left biceps: 2+  Right triceps: 2+  Left triceps: 2+  Right patellar: 2+  Left patellar: 2+  Right achilles: 2+  Left achilles: 2+  Right : 2+  Left : 2+  Right plantar: normal  Left plantar: normal  Right Gamez: absent  Left Gamez: absent  Right ankle clonus: absent  Left ankle clonus: absent  Right pendular knee jerk: absent  Left pendular knee jerk: absent        Lab Results   Component Value Date    WBC 6.10 10/14/2023    HGB 12.1 10/14/2023    HCT 37.3 10/14/2023    MCV 88 10/14/2023     10/14/2023       Sodium   Date Value Ref Range Status   10/14/2023 138 136 - 145 mmol/L Final     Potassium   Date Value Ref Range Status   10/14/2023 4.8 3.5 - 5.1 mmol/L Final     Chloride   Date Value Ref Range Status   10/14/2023 109 95 - 110 mmol/L Final     CO2   Date Value Ref Range Status   10/14/2023 21 (L) 23 - 29 mmol/L Final     Glucose   Date Value Ref Range Status   10/14/2023 98 70 - 110 mg/dL Final     BUN   Date Value Ref Range Status   10/14/2023 11 6 - 20 mg/dL Final     Creatinine   Date Value Ref  Range Status   11/03/2023 0.7 0.5 - 1.4 mg/dL Final     Calcium   Date Value Ref Range Status   10/14/2023 9.6 8.7 - 10.5 mg/dL Final     Total Protein   Date Value Ref Range Status   10/14/2023 8.1 6.0 - 8.4 g/dL Final     Albumin   Date Value Ref Range Status   10/14/2023 4.0 3.5 - 5.2 g/dL Final     Total Bilirubin   Date Value Ref Range Status   10/14/2023 0.3 0.1 - 1.0 mg/dL Final     Comment:     For infants and newborns, interpretation of results should be based  on gestational age, weight and in agreement with clinical  observations.    Premature Infant recommended reference ranges:  Up to 24 hours.............<8.0 mg/dL  Up to 48 hours............<12.0 mg/dL  3-5 days..................<15.0 mg/dL  6-29 days.................<15.0 mg/dL       Alkaline Phosphatase   Date Value Ref Range Status   10/14/2023 64 55 - 135 U/L Final     AST   Date Value Ref Range Status   10/14/2023 14 10 - 40 U/L Final     ALT   Date Value Ref Range Status   10/14/2023 10 10 - 44 U/L Final     Anion Gap   Date Value Ref Range Status   10/14/2023 8 8 - 16 mmol/L Final     eGFR if    Date Value Ref Range Status   02/17/2016 SEE COMMENT >60 mL/min/1.73 m^2 Final     eGFR if non    Date Value Ref Range Status   02/17/2016 SEE COMMENT >60 mL/min/1.73 m^2 Final     Comment:     Calculation used to obtain the estimated glomerular filtration  rate (eGFR) is the CKD-EPI equation. Since race is unknown   in our information system, the eGFR values for   -American and Non--American patients are given   for each creatinine result.  Test not performed.  GFR calculation is only valid for patients   18 and older.         Lab Results   Component Value Date    [id number] 472 10/14/2023       Lab Results   Component Value Date    TSH 2.470 10/14/2023    FREET4 0.87 10/14/2023       No results found in the last 24 hours.    No results found in the last 24 hours.      LABORATORY EVALUATION      RADIOLOGY  EVALUATION       NEUROPHYSIOLOGY EVALUATION       PATHOLOGY EVALUATION        NEUROCOGNITIVE AND NEUROPSYCHOLOGY EVALUATION       Reviewed the neuroimaging independently     Assessment:   Patient Neurological Assessment is non focal.   - Multiple Sclerosis.   - Narcolepsy W/ o Cataplexy.      Plan:   Patient symptoms ( Sleep Paralysis / Hypnagogic Hallucinations ) are typical of Narcolepsy /  appears no having Cataplexia according to Her Hx.     Start Adderall 5 mg PO BID.     Still pending - LP / CSF studies for MS work up.       NMOSD antibodies.  LP and CSF studies for MS - pending.    Personally reviewed MRI Brain / Cervical / Thoracic spines W and W/ o contrast - done  / 2023 - demyelinating lesions ( Brain / Cervical / Thoracic spine )   typical for MS.   Labs: CBC / CMP / RPR / TSH / Free T 4 / Pregnancy test / Hgb A 1 C / Lipids panel done 10/ 2023 - non significant abnormalities.      MEDICAL/SURGICAL COMORBIDITIES     All relevant medical comorbidities noted and managed by primary care physician and medical care team.        HEALTHY LIFESTYLE AND PREVENTATIVE CARE    The patient to adhere to the age-appropriate health maintenance guidelines including screening tests and vaccinations.   The patient to adhere to  healthy lifestyle, optimal weight, exercise, healthy diet, good sleep hygiene and avoiding drugs including smoking, alcohol and recreational drugs.      No follow-ups on file.    Jeff Sumner MD    General Neurology

## 2024-02-12 DIAGNOSIS — G35 MULTIPLE SCLEROSIS, RELAPSING-REMITTING: ICD-10-CM

## 2024-02-12 RX ORDER — DIMETHYL FUMARATE 240 MG/1
240 CAPSULE ORAL 2 TIMES DAILY
Qty: 60 CAPSULE | Refills: 3 | Status: SHIPPED | OUTPATIENT
Start: 2024-02-12

## 2024-02-21 ENCOUNTER — TELEPHONE (OUTPATIENT)
Dept: NEUROLOGY | Facility: CLINIC | Age: 19
End: 2024-02-21
Payer: COMMERCIAL

## 2024-02-21 DIAGNOSIS — G35 MULTIPLE SCLEROSIS, RELAPSING-REMITTING: ICD-10-CM

## 2024-02-21 DIAGNOSIS — G47.429 NARCOLEPSY DUE TO UNDERLYING CONDITION WITHOUT CATAPLEXY: Primary | ICD-10-CM

## 2024-02-21 DIAGNOSIS — R53.83 FATIGUE, UNSPECIFIED TYPE: ICD-10-CM

## 2024-02-21 RX ORDER — MODAFINIL 100 MG/1
100 TABLET ORAL DAILY
Qty: 30 TABLET | Refills: 0 | Status: SHIPPED | OUTPATIENT
Start: 2024-02-21 | End: 2024-03-18 | Stop reason: SDUPTHER

## 2024-02-28 ENCOUNTER — HOSPITAL ENCOUNTER (OUTPATIENT)
Dept: RADIOLOGY | Facility: HOSPITAL | Age: 19
Discharge: HOME | End: 2024-02-28
Attending: PSYCHIATRY & NEUROLOGY
Payer: COMMERCIAL

## 2024-02-28 DIAGNOSIS — G35 MULTIPLE SCLEROSIS, RELAPSING-REMITTING: ICD-10-CM

## 2024-02-28 LAB
B-HCG UR QL: NEGATIVE
CLARITY CSF: CLEAR
COLOR CSF: COLORLESS
CSF TUBE NUMBER: 1
CSF TUBE NUMBER: 1
CTP QC/QA: YES
GLUCOSE CSF-MCNC: 54 MG/DL (ref 40–70)
LYMPHOCYTES NFR CSF MANUAL: 97 % (ref 40–80)
MONOS+MACROS NFR CSF MANUAL: 3 % (ref 15–45)
PROT CSF-MCNC: 40 MG/DL (ref 15–40)
RBC # CSF: 4 /CU MM
SPECIMEN VOL CSF: 2 ML
WBC # CSF: 39 /CU MM (ref 0–5)

## 2024-02-28 PROCEDURE — 81025 URINE PREGNANCY TEST: CPT | Performed by: RADIOLOGY

## 2024-02-28 PROCEDURE — 86592 SYPHILIS TEST NON-TREP QUAL: CPT | Performed by: PSYCHIATRY & NEUROLOGY

## 2024-02-28 PROCEDURE — 82164 ANGIOTENSIN I ENZYME TEST: CPT | Performed by: PSYCHIATRY & NEUROLOGY

## 2024-02-28 PROCEDURE — 82945 GLUCOSE OTHER FLUID: CPT | Performed by: PSYCHIATRY & NEUROLOGY

## 2024-02-28 PROCEDURE — 62328 DX LMBR SPI PNXR W/FLUOR/CT: CPT

## 2024-02-28 PROCEDURE — 89051 BODY FLUID CELL COUNT: CPT | Performed by: PSYCHIATRY & NEUROLOGY

## 2024-02-28 PROCEDURE — 86053 AQAPRN-4 ANTB FLO CYTMTRY EA: CPT | Performed by: PSYCHIATRY & NEUROLOGY

## 2024-02-28 PROCEDURE — 62328 DX LMBR SPI PNXR W/FLUOR/CT: CPT | Mod: ,,, | Performed by: RADIOLOGY

## 2024-02-28 PROCEDURE — 83916 OLIGOCLONAL BANDS: CPT | Performed by: PSYCHIATRY & NEUROLOGY

## 2024-02-28 PROCEDURE — 84157 ASSAY OF PROTEIN OTHER: CPT | Performed by: PSYCHIATRY & NEUROLOGY

## 2024-02-28 NOTE — DISCHARGE SUMMARY
[facility] ([facility])  Discharge Note  Short Stay    FL LUMBAR PUNCTURE DIAGNOSTIC WITH IMAGING      OUTCOME: Patient tolerated treatment/procedure well without complication and is now ready for discharge.    DISPOSITION: Home or Self Care    FINAL DIAGNOSIS:  <principal problem not specified>    FOLLOWUP: In clinic    DISCHARGE INSTRUCTIONS:  No discharge procedures on file.     TIME SPENT ON DISCHARGE: 15 minutes    Pre Op Diagnosis: Abn MRI     Post Op Diagnosis: same     Procedure:  LP     Procedure performed by: Johan SAMAYOA, Melvin VERAS     Written Informed Consent Obtained: Yes     Specimen Removed:  yes     Estimated Blood Loss:  minimal     Findings: Local anesthesia     Sedation:  no     The patient tolerated the procedure well and there were no complications.      Disposition:  F/U in clinic or with ordering physician    Discharge instructions:  Light activity for 24 hours.  Remove band aid in 24 hours.  No baths (showers are appropriate).      Sterile technique was performed in the lower back, lidocaine was used as a local anesthetic.  OP 19 cm of h2o, 11 ccs clear csf to lab.  Pt tolerated the procedure well without immediate complications.  Please see radiologist report for details. F/u with PCP and/or ordering physician.

## 2024-02-29 ENCOUNTER — TELEPHONE (OUTPATIENT)
Dept: NEUROLOGY | Facility: CLINIC | Age: 19
End: 2024-02-29
Payer: COMMERCIAL

## 2024-02-29 NOTE — TELEPHONE ENCOUNTER
----- Message from Sandyzakia Baez sent at 2/29/2024  9:23 AM CST -----  .Type:  Pharmacy Calling to Clarify an RX    Name of Caller:Shelly  Pharmacy Name:[facility]  Prescription Name:modafiniL (PROVIGIL) 100 MG Tab and adderall  What do they need to clarify?:prior authorization for modafinil and adderall second level appeal because the patient didn't have narcolepsy  Best Call Back Number:[phone]  Additional Information: [phone] number for pa department

## 2024-02-29 NOTE — TELEPHONE ENCOUNTER
----- Message from Swetha Servin sent at 2/29/2024  2:22 PM CST -----  Contact: Patient, [phone]  Calling to speak with the nurse regarding Rx dimethyl fumarate 240 mg CpDR, it needs to have a staring dose on it. Please call her. Thanks.            [facility] - [place], [place] - [address]  [address]  [address]  [facility] [address]  Phone: [phone] Fax: [phone]

## 2024-02-29 NOTE — TELEPHONE ENCOUNTER
----- Message from Vincent Armstrong sent at 2/28/2024 12:00 PM CST -----  Pt is requesting a call from nurse to discuss a medication. Please call pt back at [phone]

## 2024-02-29 NOTE — TELEPHONE ENCOUNTER
----- Message from Yoon Lange sent at 2/29/2024  3:57 PM CST -----  Contact: Flor/ Albina Ray is calling regarding prior authorization on dimethyl fumarate 120 mg CpDR. Please call Flor  at [phone] ext [phone]

## 2024-03-01 ENCOUNTER — HOSPITAL ENCOUNTER (EMERGENCY)
Facility: HOSPITAL | Age: 19
Discharge: HOME | End: 2024-03-01
Attending: EMERGENCY MEDICINE
Payer: COMMERCIAL

## 2024-03-01 VITALS
WEIGHT: 116.63 LBS | HEIGHT: 65 IN | BODY MASS INDEX: 19.43 KG/M2 | RESPIRATION RATE: 16 BRPM | SYSTOLIC BLOOD PRESSURE: 166 MMHG | HEART RATE: 89 BPM | DIASTOLIC BLOOD PRESSURE: 76 MMHG | OXYGEN SATURATION: 98 % | TEMPERATURE: 98 F

## 2024-03-01 DIAGNOSIS — R51.9 ACUTE NONINTRACTABLE HEADACHE, UNSPECIFIED HEADACHE TYPE: Primary | ICD-10-CM

## 2024-03-01 LAB
ACE CSF-CCNC: 1.3 U/L (ref 0–2.5)
ALBUMIN SERPL BCP-MCNC: 4 G/DL (ref 3.5–5.2)
ALP SERPL-CCNC: 51 U/L (ref 55–135)
ALT SERPL W/O P-5'-P-CCNC: 11 U/L (ref 10–44)
ANION GAP SERPL CALC-SCNC: 10 MMOL/L (ref 8–16)
AST SERPL-CCNC: 14 U/L (ref 10–40)
B-HCG UR QL: NEGATIVE
BASOPHILS # BLD AUTO: 0.02 K/UL (ref 0–0.2)
BASOPHILS NFR BLD: 0.4 % (ref 0–1.9)
BILIRUB SERPL-MCNC: 0.3 MG/DL (ref 0.1–1)
BILIRUB UR QL STRIP: NEGATIVE
BUN SERPL-MCNC: 9 MG/DL (ref 6–20)
CALCIUM SERPL-MCNC: 9.6 MG/DL (ref 8.7–10.5)
CHLORIDE SERPL-SCNC: 108 MMOL/L (ref 95–110)
CLARITY UR: CLEAR
CO2 SERPL-SCNC: 21 MMOL/L (ref 23–29)
COLOR UR: COLORLESS
CREAT SERPL-MCNC: 0.7 MG/DL (ref 0.5–1.4)
DIFFERENTIAL METHOD BLD: NORMAL
EOSINOPHIL # BLD AUTO: 0 K/UL (ref 0–0.5)
EOSINOPHIL NFR BLD: 0.8 % (ref 0–8)
ERYTHROCYTE [DISTWIDTH] IN BLOOD BY AUTOMATED COUNT: 13 % (ref 11.5–14.5)
EST. GFR  (NO RACE VARIABLE): >60 ML/MIN/1.73 M^2
GLUCOSE SERPL-MCNC: 105 MG/DL (ref 70–110)
GLUCOSE UR QL STRIP: NEGATIVE
HCT VFR BLD AUTO: 37.1 % (ref 37–48.5)
HGB BLD-MCNC: 12.1 G/DL (ref 12–16)
HGB UR QL STRIP: ABNORMAL
IMM GRANULOCYTES # BLD AUTO: 0.01 K/UL (ref 0–0.04)
IMM GRANULOCYTES NFR BLD AUTO: 0.2 % (ref 0–0.5)
INFLUENZA A, MOLECULAR: NEGATIVE
INFLUENZA B, MOLECULAR: NEGATIVE
KETONES UR QL STRIP: NEGATIVE
LEUKOCYTE ESTERASE UR QL STRIP: ABNORMAL
LYMPHOCYTES # BLD AUTO: 2 K/UL (ref 1–4.8)
LYMPHOCYTES NFR BLD: 37.3 % (ref 18–48)
MCH RBC QN AUTO: 28.3 PG (ref 27–31)
MCHC RBC AUTO-ENTMCNC: 32.6 G/DL (ref 32–36)
MCV RBC AUTO: 87 FL (ref 82–98)
MICROSCOPIC COMMENT: NORMAL
MONOCYTES # BLD AUTO: 0.6 K/UL (ref 0.3–1)
MONOCYTES NFR BLD: 10.5 % (ref 4–15)
NEUTROPHILS # BLD AUTO: 2.7 K/UL (ref 1.8–7.7)
NEUTROPHILS NFR BLD: 50.8 % (ref 38–73)
NITRITE UR QL STRIP: NEGATIVE
NRBC BLD-RTO: 0 /100 WBC
OLIGOCLONAL BANDS CSF ELPH-IMP: 10 BANDS
OLIGOCLONAL BANDS CSF ELPH-IMP: 14 BANDS
OLIGOCLONAL BANDS SERPL: 4 BANDS
PH UR STRIP: 6 [PH] (ref 5–8)
PLATELET # BLD AUTO: 286 K/UL (ref 150–450)
PMV BLD AUTO: 10.9 FL (ref 9.2–12.9)
POTASSIUM SERPL-SCNC: 3.9 MMOL/L (ref 3.5–5.1)
PROT SERPL-MCNC: 8.2 G/DL (ref 6–8.4)
PROT UR QL STRIP: NEGATIVE
RBC # BLD AUTO: 4.28 M/UL (ref 4–5.4)
RBC #/AREA URNS HPF: 2 /HPF (ref 0–4)
SARS-COV-2 RDRP RESP QL NAA+PROBE: NEGATIVE
SODIUM SERPL-SCNC: 139 MMOL/L (ref 136–145)
SP GR UR STRIP: 1.01 (ref 1–1.03)
SPECIMEN SOURCE: NORMAL
SQUAMOUS #/AREA URNS HPF: 2 /HPF
URN SPEC COLLECT METH UR: ABNORMAL
UROBILINOGEN UR STRIP-ACNC: NEGATIVE EU/DL
VDRL CSF QL: NEGATIVE
WBC # BLD AUTO: 5.25 K/UL (ref 3.9–12.7)
WBC #/AREA URNS HPF: 2 /HPF (ref 0–5)

## 2024-03-01 PROCEDURE — 85025 COMPLETE CBC W/AUTO DIFF WBC: CPT | Performed by: REGISTERED NURSE

## 2024-03-01 PROCEDURE — 81000 URINALYSIS NONAUTO W/SCOPE: CPT | Performed by: REGISTERED NURSE

## 2024-03-01 PROCEDURE — 87502 INFLUENZA DNA AMP PROBE: CPT | Performed by: REGISTERED NURSE

## 2024-03-01 PROCEDURE — U0002 COVID-19 LAB TEST NON-CDC: HCPCS | Performed by: REGISTERED NURSE

## 2024-03-01 PROCEDURE — 80053 COMPREHEN METABOLIC PANEL: CPT | Performed by: REGISTERED NURSE

## 2024-03-01 PROCEDURE — 99283 EMERGENCY DEPT VISIT LOW MDM: CPT

## 2024-03-01 PROCEDURE — 81025 URINE PREGNANCY TEST: CPT | Performed by: REGISTERED NURSE

## 2024-03-01 RX ORDER — BUTALBITAL, ACETAMINOPHEN AND CAFFEINE 50; 325; 40 MG/1; MG/1; MG/1
1 TABLET ORAL EVERY 6 HOURS PRN
Qty: 20 TABLET | Refills: 0 | Status: SHIPPED | OUTPATIENT
Start: 2024-03-01 | End: 2024-03-31

## 2024-03-01 NOTE — ED PROVIDER NOTES
Encounter Date: 3/1/2024       History     Chief Complaint   Patient presents with    Headache     Neck, and R shoulder pain after spinal tap on Monday     18-year-old female presents to the emergency department with complaints of neck, right shoulder pain and headache.  Patient reports having a spinal tap done 2 days ago.  History of MS.  Subjective fever at home.  Patient states headache is improved with laying down.  Patient denies any cough, congestion, abdominal pain, vomiting or any other symptoms.    The history is provided by the patient.     Review of patient's allergies indicates:  No Known Allergies  Past Medical History:   Diagnosis Date    Asthma     Bipolar disorder, unspecified      Past Surgical History:   Procedure Laterality Date    ADENOIDECTOMY      TONSILLECTOMY       Family History   Problem Relation Age of Onset    Hypertension Father      Social History     Tobacco Use    Smoking status: Never     Passive exposure: Never    Smokeless tobacco: Never   Substance Use Topics    Alcohol use: No    Drug use: Never     Review of Systems   Constitutional:  Negative for fever.   HENT:  Negative for sore throat.    Respiratory:  Negative for shortness of breath.    Cardiovascular:  Negative for chest pain.   Gastrointestinal:  Negative for nausea.   Genitourinary:  Negative for dysuria.   Musculoskeletal:  Positive for arthralgias and neck pain. Negative for back pain.   Skin:  Negative for rash.   Neurological:  Positive for headaches. Negative for weakness.   Hematological:  Does not bruise/bleed easily.   All other systems reviewed and are negative.      Physical Exam     Initial Vitals [03/01/24 1312]   BP Pulse Resp Temp SpO2   (!) 166/76 89 16 98.2 °F (36.8 °C) 98 %      MAP       --         Physical Exam    Constitutional: She appears well-developed and well-nourished. She is not diaphoretic. No distress.   HENT:   Head: Normocephalic and atraumatic.   Eyes: Conjunctivae and EOM are normal.  Pupils are equal, round, and reactive to light.   Neck: Neck supple.   Normal range of motion.  Cardiovascular:  Normal rate, regular rhythm and normal heart sounds.           No murmur heard.  Pulmonary/Chest: Breath sounds normal. No respiratory distress. She has no wheezes. She has no rales.   Abdominal: Abdomen is soft. Bowel sounds are normal. There is no abdominal tenderness. There is no rebound and no guarding.   Musculoskeletal:         General: No tenderness or edema. Normal range of motion.      Cervical back: Normal range of motion and neck supple.     Neurological: She is alert and oriented to person, place, and time. No cranial nerve deficit. GCS score is 15. GCS eye subscore is 4. GCS verbal subscore is 5. GCS motor subscore is 6.   Skin: Skin is warm and dry. Capillary refill takes less than 2 seconds.   Psychiatric: She has a normal mood and affect. Thought content normal.         ED Course   Procedures  Labs Reviewed   URINALYSIS, REFLEX TO URINE CULTURE - Abnormal; Notable for the following components:       Result Value    Color, UA Colorless (*)     Occult Blood UA Trace (*)     Leukocytes, UA 1+ (*)     All other components within normal limits    Narrative:     Specimen Source->Urine   COMPREHENSIVE METABOLIC PANEL - Abnormal; Notable for the following components:    CO2 21 (*)     Alkaline Phosphatase 51 (*)     All other components within normal limits   INFLUENZA A & B BY MOLECULAR   CBC W/ AUTO DIFFERENTIAL    Narrative:     Release to patient->Immediate   SARS-COV-2 RNA AMPLIFICATION, QUAL   PREGNANCY TEST, URINE RAPID    Narrative:     Specimen Source->Urine   URINALYSIS MICROSCOPIC    Narrative:     Specimen Source->Urine          Imaging Results    None          Medications - No data to display  Medical Decision Making  Amount and/or Complexity of Data Reviewed  Labs: ordered.     Details: Unremarkable    Risk  Prescription drug management.  Risk Details: Patient states headache has  improved since being in the emergency department.  Vital signs are stable.  Labs are unremarkable.  Advised patient to rest and take Fioricet as needed for headache.  Patient should return the emergency department for any worsening headache.                                      Clinical Impression:  Final diagnoses:  [R51.9] Acute nonintractable headache, unspecified headache type (Primary)          ED Disposition Condition    Discharge Stable          ED Prescriptions       Medication Sig Dispense Start Date End Date Auth. Provider    butalbital-acetaminophen-caffeine -40 mg (FIORICET, ESGIC) -40 mg per tablet Take 1 tablet by mouth every 6 (six) hours as needed for Headaches. 20 tablet 3/1/2024 3/31/2024 Noah Nunez Jr., JOHANA          Follow-up Information       Follow up With Specialties Details Why Contact Info    Robson Leonard MD Pediatrics In 1 week  [address] [place] [address] [address]  [place] [address]  [phone]               Noah Nunez Jr., JOHANA  03/01/24 8425

## 2024-03-02 ENCOUNTER — ANESTHESIA (OUTPATIENT)
Dept: EMERGENCY MEDICINE | Facility: HOSPITAL | Age: 19
End: 2024-03-02
Payer: COMMERCIAL

## 2024-03-02 ENCOUNTER — ANESTHESIA EVENT (OUTPATIENT)
Dept: EMERGENCY MEDICINE | Facility: HOSPITAL | Age: 19
End: 2024-03-02
Payer: COMMERCIAL

## 2024-03-02 ENCOUNTER — HOSPITAL ENCOUNTER (EMERGENCY)
Facility: HOSPITAL | Age: 19
Discharge: HOME | End: 2024-03-02
Attending: EMERGENCY MEDICINE
Payer: COMMERCIAL

## 2024-03-02 VITALS
DIASTOLIC BLOOD PRESSURE: 64 MMHG | BODY MASS INDEX: 19.48 KG/M2 | SYSTOLIC BLOOD PRESSURE: 119 MMHG | RESPIRATION RATE: 17 BRPM | TEMPERATURE: 99 F | OXYGEN SATURATION: 100 % | HEART RATE: 85 BPM | HEIGHT: 65 IN | WEIGHT: 116.94 LBS

## 2024-03-02 DIAGNOSIS — G97.1 SPINAL HEADACHE: Primary | ICD-10-CM

## 2024-03-02 PROCEDURE — 63600175 PHARM REV CODE 636 W HCPCS: Performed by: EMERGENCY MEDICINE

## 2024-03-02 PROCEDURE — 25000003 PHARM REV CODE 250: Performed by: EMERGENCY MEDICINE

## 2024-03-02 PROCEDURE — 99284 EMERGENCY DEPT VISIT MOD MDM: CPT | Mod: 25

## 2024-03-02 PROCEDURE — 96372 THER/PROPH/DIAG INJ SC/IM: CPT | Performed by: EMERGENCY MEDICINE

## 2024-03-02 RX ORDER — ONDANSETRON 4 MG/1
4 TABLET, ORALLY DISINTEGRATING ORAL
Status: COMPLETED | OUTPATIENT
Start: 2024-03-02 | End: 2024-03-02

## 2024-03-02 RX ORDER — HYDROMORPHONE HYDROCHLORIDE 2 MG/ML
1 INJECTION, SOLUTION INTRAMUSCULAR; INTRAVENOUS; SUBCUTANEOUS
Status: COMPLETED | OUTPATIENT
Start: 2024-03-02 | End: 2024-03-02

## 2024-03-02 RX ADMIN — ONDANSETRON 4 MG: 4 TABLET, ORALLY DISINTEGRATING ORAL at 06:03

## 2024-03-02 RX ADMIN — HYDROMORPHONE HYDROCHLORIDE 1 MG: 2 INJECTION INTRAMUSCULAR; INTRAVENOUS; SUBCUTANEOUS at 06:03

## 2024-03-03 NOTE — ED PROVIDER NOTES
SCRIBE #1 NOTE: I, Brandy Yang, am scribing for, and in the presence of, David Morgan Jr., MD. I have scribed the entire note.       History     Chief Complaint   Patient presents with    Headache     Headache after spinal tap wednesday, came in yesterday but meds aren't helping     Review of patient's allergies indicates:  No Known Allergies      History of Present Illness     HPI    3/2/2024, 7:01 PM  History obtained from the patient      History of Present Illness: Andree Zavala is a 18 y.o. female patient with a PMHx of MS (dx 2024) who presents to the Emergency Department for evaluation of HA which onset 3 days PTA following spinal tap. HA is above bilateral eyebrows and at the base of her head. Symptoms are intermittent and moderate in severity. No mitigating or exacerbating factors reported. No associated sxs included. Prior Tx includes butalbital-acetaminophen-caffeine -40 mg, which did not mitigate sxs. Pt notes occasional migraines in the past. No further complaints or concerns at this time.       Arrival mode: Personal vehicle      PCP: Robson Leonard MD        Past Medical History:  Past Medical History:   Diagnosis Date    Asthma     Bipolar disorder, unspecified        Past Surgical History:  Past Surgical History:   Procedure Laterality Date    ADENOIDECTOMY      TONSILLECTOMY           Family History:  Family History   Problem Relation Age of Onset    Hypertension Father        Social History:  Social History     Tobacco Use    Smoking status: Never     Passive exposure: Never    Smokeless tobacco: Never   Substance and Sexual Activity    Alcohol use: No    Drug use: Never    Sexual activity: Yes     Partners: Male     Birth control/protection: Condom, Patch     Comment: Birth control pills        Review of Systems     Review of Systems   Constitutional: Negative.    HENT: Negative.     Eyes: Negative.    Respiratory: Negative.     Cardiovascular: Negative.    Gastrointestinal:  "Negative.    Endocrine: Negative.    Genitourinary: Negative.    Musculoskeletal: Negative.    Skin: Negative.    Allergic/Immunologic: Negative.    Neurological:  Positive for headaches.   Hematological: Negative.    Psychiatric/Behavioral: Negative.     All other systems reviewed and are negative.       Physical Exam     Initial Vitals [03/02/24 1818]   BP Pulse Resp Temp SpO2   137/65 105 16 98.5 °F (36.9 °C) 100 %      MAP       --          Physical Exam  Nursing Notes and Vital Signs Reviewed.  Constitutional: Patient is in no acute distress. Well-developed and well-nourished.  Head: Atraumatic. Normocephalic.  Eyes:  EOM intact.  No scleral icterus.  ENT: Mucous membranes are moist.  Nares clear   Neck:  Full ROM. No JVD.  Cardiovascular: Regular rate. Regular rhythm No murmurs, rubs, or gallops. Distal pulses are 2+ and symmetric  Pulmonary/Chest: No respiratory distress. Clear to auscultation bilaterally. No wheezing or rales.  Equal chest wall rise bilaterally  Abdominal: Soft and non-distended.  There is no tenderness.  No rebound, guarding, or rigidity. Good bowel sounds.  Genitourinary: No CVA tenderness.  No suprapubic tenderness  Musculoskeletal: Moves all extremities. No obvious deformities.  5 x 5 strength in all extremities   Skin: Warm and dry.  Neurological:  Alert, awake, and appropriate.  Normal speech.  No acute focal neurological deficits are appreciated.  Two through 12 intact bilaterally.  Psychiatric: Normal affect. Good eye contact. Appropriate in content.     ED Course   Procedures  ED Vital Signs:  Vitals:    03/02/24 1818 03/02/24 1843 03/02/24 1918   BP: 137/65 128/71 119/64   Pulse: 105 91 85   Resp: 16 18 17   Temp: 98.5 °F (36.9 °C)     TempSrc: Oral     SpO2: 100% 100% 100%   Weight: 53 kg (116 lb 15.3 oz)     Height: 5' 5" (1.651 m)         Abnormal Lab Results:  Labs Reviewed - No data to display     All Lab Results:  None    Imaging Results:  Imaging Results    None             "     The Emergency Provider reviewed the vital signs and test results, which are outlined above.     ED Discussion     7:26 PM: Reassessed pt at this time. Discussed with pt all pertinent ED information and results. Discussed pt dx and plan of tx. Gave pt all f/u and return to the ED instructions. All questions and concerns were addressed at this time. Pt expresses understanding of information and instructions, and is comfortable with plan to discharge. Pt is stable for discharge.    I discussed with patient and/or family/caretaker that evaluation in the ED does not suggest any emergent or life threatening medical conditions requiring immediate intervention beyond what was provided in the ED, and I believe patient is safe for discharge.  Regardless, an unremarkable evaluation in the ED does not preclude the development or presence of a serious of life threatening condition. As such, patient was instructed to return immediately for any worsening or change in current symptoms.       Medical Decision Making  Differential diagnosis:  Headache, migraine, spinal headache, postoperative pain    Patient was evaluated history physical examination.  Chart review was undertaken.  Patient was recent spinal tap secondary to MS.  Notes headache since.  This is refractory to Fioricet prescribed yesterday.  Blood patch provided here with marked improvement in symptoms.  Patient was stable safe for discharge in my opinion    Amount and/or Complexity of Data Reviewed  Discussion of management or test interpretation with external provider(s): Consultation obtained with anesthesia.  Blood patch performed at bedside    Risk  OTC drugs.  Prescription drug management.  Parenteral controlled substances.  Minor surgery with no identified risk factors.                ED Medication(s):  Medications   HYDROmorphone (PF) injection 1 mg (1 mg Intramuscular Given 3/2/24 1843)   ondansetron disintegrating tablet 4 mg (4 mg Oral Given 3/2/24 1843)        New Prescriptions    No medications on file        Follow-up Information       Robson Leonard MD.    Specialty: Pediatrics  Contact information:  [address]  [place] [address]  [phone]                                 Scribe Attestation:   Scribe #1: I performed the above scribed service and the documentation accurately describes the services I performed. I attest to the accuracy of the note.     Attending:   Physician Attestation Statement for Scribe #1: I, David Morgan Jr., MD, personally performed the services described in this documentation, as scribed by Brandy Yang, in my presence, and it is both accurate and complete.           Clinical Impression       ICD-10-CM ICD-9-CM   1. Spinal headache  G97.1 349.0       Disposition:   Disposition: Discharged  Condition: Stable         David Morgan Jr., MD  03/02/24 1937

## 2024-03-03 NOTE — ANESTHESIA PROCEDURE NOTES
Epidural Blood Patch    Patient location during procedure: ED   Reason for epidural blood patch: post dural puncture headache   Start time: 3/2/2024 7:02 PM  Timeout: 3/2/2024 7:02 PM  End time: 3/2/2024 7:10 PM     Staffing  Authorizing Provider: Sebastián Keen CRNA  Performing Provider: Sebastián Keen CRNA    Staffing  Performed by: Sebastián Keen CRNA  Authorized by: Sebastián Keen CRNA    Preanesthetic Checklist  Completed: patient identified, IV checked, site marked, risks and benefits discussed, surgical consent, monitors and equipment checked, pre-op evaluation, timeout performed, anesthesia consent given, hand hygiene performed and patient being monitored  Preparation  Patient position: sitting  Prep: ChloraPrep  Patient monitoring: Pulse Ox and Blood Pressure Block not for primary anesthetic.  Epidural Blood Patch  Skin Anesthetic: lidocaine 1%  Skin Wheal: 3 mL  Approach: midline  Interspace: L3-4  Blood was aseptically collected.  Collection Location: venous    Needle and Epidural Catheter  Needle type: Tuohy   Needle gauge: 17  Needle length: 3.5 inches  Needle insertion depth: 6 cm  Additional Documentation: incremental injection, negative aspiration for heme and CSF, no signs/symptoms of IV or SA injection, no significant complaints from patient and no significant pain on injection  Needle localization: anatomical landmarks  Assessment  Ease of injection: easy  Patient's tolerance of the procedure: comfortable throughoutAmount of blood injected: 16mL.  Additional Notes  Pt reports pain level decreased from 9 to 4 post blood patch  Anesthesiologist Present  No  Post Dural Headache Resolved   Yes

## 2024-03-06 ENCOUNTER — PATIENT MESSAGE (OUTPATIENT)
Dept: NEUROLOGY | Facility: CLINIC | Age: 19
End: 2024-03-06
Payer: COMMERCIAL

## 2024-03-06 ENCOUNTER — TELEPHONE (OUTPATIENT)
Dept: NEUROLOGY | Facility: CLINIC | Age: 19
End: 2024-03-06
Payer: COMMERCIAL

## 2024-03-06 NOTE — TELEPHONE ENCOUNTER
----- Message from Felecia Fuchs sent at 3/6/2024 12:21 PM CST -----  Contact: Shelly/ Mother  .Patients Mother is calling to speak with the nurse regarding medication  . Reports wanting to know the status of the patients medication . Please give patients mother a call back at .[phone]

## 2024-03-06 NOTE — TELEPHONE ENCOUNTER
----- Message from Pilar Meier sent at 3/6/2024  9:17 AM CST -----  Regarding: rx concerns  Name of who is calling:   [facility] / [place]ly      What is the request in detail: Requesting clarification and a call back on rxs MODAFINIL 100 mgs and DEXTRO AMPHETATINE 5 mgs...both need PA with the plan/ needs clarification on these meds asap / 2nd request      Can the clinic reply by [facility]:no      What number to call back if not [facility]:[phone] fax # [phone]

## 2024-03-06 NOTE — TELEPHONE ENCOUNTER
Spoke to patient and informed her that I need a picture of her RX card to start the PA process. Patient verbalized understanding and stated she will send it through [facility]

## 2024-03-08 LAB — NMO/AQP4 FACS,CSF: NEGATIVE

## 2024-03-18 DIAGNOSIS — G47.429 NARCOLEPSY DUE TO UNDERLYING CONDITION WITHOUT CATAPLEXY: ICD-10-CM

## 2024-03-18 DIAGNOSIS — R53.83 FATIGUE, UNSPECIFIED TYPE: ICD-10-CM

## 2024-03-18 DIAGNOSIS — G35 MULTIPLE SCLEROSIS, RELAPSING-REMITTING: ICD-10-CM

## 2024-03-19 RX ORDER — MODAFINIL 100 MG/1
100 TABLET ORAL DAILY
Qty: 30 TABLET | Refills: 0 | Status: SHIPPED | OUTPATIENT
Start: 2024-03-19 | End: 2024-05-08 | Stop reason: SDUPTHER

## 2024-04-07 NOTE — PROGRESS NOTES
Subjective:       Patient ID: Andree Zavala is a 18 y.o. female.    Chief Complaint: No chief complaint on file.    HPI    The patient presented with Parents on 02/ 2024  for evaluation of MS.  New issues: none.   Stable.  Feels better - no EDS / more alert / better concentration with Provigil.   Started Tecfidera.     Review of Systems   Neurological:  Positive for dizziness and numbness.                 Current Outpatient Medications:     dimethyl fumarate 240 mg CpDR, Take 240 mg by mouth 2 (two) times daily., Disp: 60 capsule, Rfl: 3    fluconazole (DIFLUCAN) 150 MG Tab, Take 1 tab PO Q 3 days, Disp: 2 tablet, Rfl: 0    gabapentin (NEURONTIN) 300 MG capsule, Take 300 mg by mouth 3 (three) times daily., Disp: , Rfl:     meclizine (ANTIVERT) 12.5 mg tablet, Take 12.5 mg by mouth 2 (two) times daily., Disp: , Rfl:     modafiniL (PROVIGIL) 100 MG Tab, Take 1 tablet (100 mg total) by mouth once daily., Disp: 30 tablet, Rfl: 0    norelgestromin-ethinyl estradiol (XULANE) 150-35 mcg/24 hr, APPLY 1 PATCH TOPICALLY TO THE SKIN 1 TIME A WEEK, Disp: 3 patch, Rfl: 11    nortriptyline (PAMELOR) 75 MG Cap, Take 75 mg by mouth., Disp: , Rfl:     ondansetron (ZOFRAN-ODT) 4 MG TbDL, DISSOLVE 1 TABLET ON THE TONGUE EVERY 12 HOURS AS NEEDED FOR NAUSEA, Disp: , Rfl:     Past Medical History:   Diagnosis Date    Asthma     Bipolar disorder, unspecified        Past Surgical History:   Procedure Laterality Date    ADENOIDECTOMY      TONSILLECTOMY         Social History     Socioeconomic History    Marital status: Single   Tobacco Use    Smoking status: Never     Passive exposure: Never    Smokeless tobacco: Never   Substance and Sexual Activity    Alcohol use: No    Drug use: Never    Sexual activity: Yes     Partners: Male     Birth control/protection: Condom, Patch     Comment: Birth control pills     Social Determinants of Health     Financial Resource Strain: Low Risk  (12/29/2023)    Overall Financial Resource Strain (CARDIA)      Difficulty of Paying Living Expenses: Not very hard   Food Insecurity: No Food Insecurity (12/29/2023)    Hunger Vital Sign     Worried About Running Out of Food in the Last Year: Never true     Ran Out of Food in the Last Year: Never true   Transportation Needs: No Transportation Needs (12/29/2023)    PRAPARE - Transportation     Lack of Transportation (Medical): No     Lack of Transportation (Non-Medical): No   Physical Activity: Inactive (12/29/2023)    Exercise Vital Sign     Days of Exercise per Week: 0 days     Minutes of Exercise per Session: 0 min   Stress: No Stress Concern Present (12/29/2023)    [place] [employer] - Occupational Stress Questionnaire     Feeling of Stress : Only a little   Social Connections: Unknown (12/29/2023)    Social Connection and Isolation Panel [NHANES]     Frequency of Communication with Friends and Family: More than three times a week     Frequency of Social Gatherings with Friends and Family: Once a week     Active Member of Clubs or Organizations: No     Attends Club or Organization Meetings: Patient declined     Marital Status: Living with partner   Housing Stability: Low Risk  (12/29/2023)    Housing Stability Vital Sign     Unable to Pay for Housing in the Last Year: No     Number of Places Lived in the Last Year: 2     Unstable Housing in the Last Year: No       Past/Current Medical/Surgical History, Past/Current Social History, Past/Current Family History and Past/Current Medications were reviewed in detail.    Objective:       VITAL SIGNS WERE REVIEWED      GENERAL APPEARANCE:     The patient looks comfortable.    BMI    No signs of respiratory distress.    Normal breathing pattern.    No dysmorphic features    Normal eye contact.       GENERAL MEDICAL EXAM:    HEENT:  Head is atraumatic normocephalic.     FUNDOSCOPIC (OPHTHALMOSCOPIC) EXAMINATION showed no disc edema (papilledema).      NECK: No JVD. No visible lesions or goiters.      CHEST-CARDIOPULMONARY: No cyanosis. No tachypnea. Normal respiratory effort.    OHAVUUK-FZSPEIXRMULUTKJP-NQESPHYZCB: No jaundice. No stomas or lesions. No visible hernias. No catheters.     SKIN, HAIR, NAILS: No pathognomonic skin rash.No neurofibromatosis. No visible lesions.No stigmata of autoimmune disease. No clubbing.    LIMBS: No varicose veins. No visible swelling.    MUSCULOSKELETAL: No visible deformities.No visible lesions.    Neurologic Exam     Mental Status   Oriented to person, place, and time.   Registration: recalls 3 of 3 objects. Recall at 5 minutes: recalls 3 of 3 objects. Follows 3 step commands.   Attention: normal. Concentration: normal.   Speech: speech is normal   Level of consciousness: alert  Knowledge: good. Able to perform simple calculations.   Able to name object. Able to read. Able to repeat. Able to write. Normal comprehension.     Cranial Nerves   Cranial nerves II through XII intact.     Motor Exam   Muscle bulk: normal  Overall muscle tone: normal    Strength   Strength 5/5 throughout.   Right neck flexion: 5/5  Left neck flexion: 5/5  Right neck extension: 5/5  Left neck extension: 5/5  Right deltoid: 5/5  Left deltoid: 5/5  Right biceps: 5/5  Left biceps: 5/5  Right triceps: 5/5  Left triceps: 5/5  Right wrist flexion: 5/5  Left wrist flexion: 5/5  Right wrist extension: 5/5  Left wrist extension: 5/5  Right interossei: 5/5  Left interossei: 5/5  Right abdominals: 5/5  Left abdominals: 5/5  Right iliopsoas: 5/5  Left iliopsoas: 5/5  Right quadriceps: 5/5  Left quadriceps: 5/5  Right hamstrin/5  Left hamstrin/5  Right glutei: 5/5  Left glutei: 5/5  Right anterior tibial: 5/5  Left anterior tibial: 5/5  Right posterior tibial: 5/5  Left posterior tibial: 5/5  Right peroneal: 5/5  Left peroneal: 5/5  Right gastroc: 5/5  Left gastroc: 5/5    Sensory Exam   Light touch normal.   Vibration normal.   Proprioception normal.   Pinprick normal.   Graphesthesia:  normal  Stereognosis: normal    Gait, Coordination, and Reflexes     Gait  Gait: normal    Coordination   Romberg: negative  Finger to nose coordination: normal  Heel to shin coordination: normal  Tandem walking coordination: normal    Tremor   Resting tremor: absent  Intention tremor: absent  Action tremor: absent    Reflexes   Right plantar: upgoing  Left plantar: equivocal  Right Gamez: absent  Left Gamez: absent  Right ankle clonus: present  Left ankle clonus: absent  Right pendular knee jerk: absent  Left pendular knee jerk: absentPositive Lhermitte's sign.          Lab Results   Component Value Date    WBC 5.25 03/01/2024    HGB 12.1 03/01/2024    HCT 37.1 03/01/2024    MCV 87 03/01/2024     03/01/2024       Sodium   Date Value Ref Range Status   03/01/2024 139 136 - 145 mmol/L Final     Potassium   Date Value Ref Range Status   03/01/2024 3.9 3.5 - 5.1 mmol/L Final     Chloride   Date Value Ref Range Status   03/01/2024 108 95 - 110 mmol/L Final     CO2   Date Value Ref Range Status   03/01/2024 21 (L) 23 - 29 mmol/L Final     Glucose   Date Value Ref Range Status   03/01/2024 105 70 - 110 mg/dL Final     BUN   Date Value Ref Range Status   03/01/2024 9 6 - 20 mg/dL Final     Creatinine   Date Value Ref Range Status   03/01/2024 0.7 0.5 - 1.4 mg/dL Final     Calcium   Date Value Ref Range Status   03/01/2024 9.6 8.7 - 10.5 mg/dL Final     Total Protein   Date Value Ref Range Status   03/01/2024 8.2 6.0 - 8.4 g/dL Final     Albumin   Date Value Ref Range Status   03/01/2024 4.0 3.5 - 5.2 g/dL Final     Total Bilirubin   Date Value Ref Range Status   03/01/2024 0.3 0.1 - 1.0 mg/dL Final     Comment:     For infants and newborns, interpretation of results should be based  on gestational age, weight and in agreement with clinical  observations.    Premature Infant recommended reference ranges:  Up to 24 hours.............<8.0 mg/dL  Up to 48 hours............<12.0 mg/dL  3-5 days..................<15.0  mg/dL  6-29 days.................<15.0 mg/dL       Alkaline Phosphatase   Date Value Ref Range Status   03/01/2024 51 (L) 55 - 135 U/L Final     AST   Date Value Ref Range Status   03/01/2024 14 10 - 40 U/L Final     ALT   Date Value Ref Range Status   03/01/2024 11 10 - 44 U/L Final     Anion Gap   Date Value Ref Range Status   03/01/2024 10 8 - 16 mmol/L Final     eGFR if    Date Value Ref Range Status   02/17/2016 SEE COMMENT >60 mL/min/1.73 m^2 Final     eGFR if non    Date Value Ref Range Status   02/17/2016 SEE COMMENT >60 mL/min/1.73 m^2 Final     Comment:     Calculation used to obtain the estimated glomerular filtration  rate (eGFR) is the CKD-EPI equation. Since race is unknown   in our information system, the eGFR values for   -American and Non--American patients are given   for each creatinine result.  Test not performed.  GFR calculation is only valid for patients   18 and older.         Lab Results   Component Value Date    GFJYHNHB86 472 10/14/2023       Lab Results   Component Value Date    TSH 2.470 10/14/2023    FREET4 0.87 10/14/2023       No results found in the last 24 hours.    No results found in the last 24 hours.      LABORATORY EVALUATION      RADIOLOGY EVALUATION       NEUROPHYSIOLOGY EVALUATION       PATHOLOGY EVALUATION        NEUROCOGNITIVE AND NEUROPSYCHOLOGY EVALUATION     Reviewed the neuroimaging independently     Assessment:   Patient Neurological Assessment is non focal.   - Multiple Sclerosis.   - Narcolepsy W/ o Cataplexy.      Plan:   Doing well.   MS specialist - waiting list.     Patient symptoms ( Sleep Paralysis / Hypnagogic Hallucinations ) are typical of Narcolepsy /  appears no having Cataplexia according to Her Hx.      Continue Provigil 100 mg PO daily.      LP: OP - 19 Cm H2O.  CSF studies: Glucose / Total Protein / VDRL / ACE - normal parameters.     Cells count:   Heme [place] mL 2.0   Appearance, CSF Clear Clear    Color, CSF Colorless Colorless   WBC, CSF 0 - 5 /cu mm 39 High    RBC, CSF 0 /cu mm 4 Abnormal    Lymphs, CSF 40 - 80 % 97 High    Mono/Macrophage, CSF       OLGC band: 10 (  < 2 )   NMO Aqua porin: negative.     Personally reviewed MRI Brain / Cervical / Thoracic spines W and W/ o contrast - done  / 2023 - demyelinating lesions ( Brain / Cervical / Thoracic spine )   typical for MS.   Labs: CBC / CMP / RPR / TSH / Free T 4 / Pregnancy test / Hgb A 1 C / Lipids panel done 10/ 2023 - non significant abnormalities.    MEDICAL/SURGICAL COMORBIDITIES     All relevant medical comorbidities noted and managed by primary care physician and medical care team.      HEALTHY LIFESTYLE AND PREVENTATIVE CARE    The patient to adhere to the age-appropriate health maintenance guidelines including screening tests and vaccinations.   The patient to adhere to  healthy lifestyle, optimal weight, exercise, healthy diet, good sleep hygiene   and avoiding drugs including smoking, alcohol and recreational drugs.      No follow-ups on file.    Jeff Sumner MD    General Neurology.

## 2024-04-10 ENCOUNTER — OFFICE VISIT (OUTPATIENT)
Dept: NEUROLOGY | Facility: CLINIC | Age: 19
End: 2024-04-10
Payer: COMMERCIAL

## 2024-04-10 VITALS
HEIGHT: 65 IN | DIASTOLIC BLOOD PRESSURE: 72 MMHG | SYSTOLIC BLOOD PRESSURE: 120 MMHG | HEART RATE: 111 BPM | BODY MASS INDEX: 19.07 KG/M2 | WEIGHT: 114.44 LBS

## 2024-04-10 DIAGNOSIS — G47.429 NARCOLEPSY DUE TO UNDERLYING CONDITION WITHOUT CATAPLEXY: ICD-10-CM

## 2024-04-10 DIAGNOSIS — G35 MULTIPLE SCLEROSIS, RELAPSING-REMITTING: Primary | ICD-10-CM

## 2024-04-10 PROCEDURE — 1160F RVW MEDS BY RX/DR IN RCRD: CPT | Mod: CPTII,S$GLB,, | Performed by: PSYCHIATRY & NEUROLOGY

## 2024-04-10 PROCEDURE — 1159F MED LIST DOCD IN RCRD: CPT | Mod: CPTII,S$GLB,, | Performed by: PSYCHIATRY & NEUROLOGY

## 2024-04-10 PROCEDURE — 99999 PR PBB SHADOW E&M-EST. PATIENT-LVL IV: CPT | Mod: PBBFAC,,, | Performed by: PSYCHIATRY & NEUROLOGY

## 2024-04-10 PROCEDURE — 3074F SYST BP LT 130 MM HG: CPT | Mod: CPTII,S$GLB,, | Performed by: PSYCHIATRY & NEUROLOGY

## 2024-04-10 PROCEDURE — 3008F BODY MASS INDEX DOCD: CPT | Mod: CPTII,S$GLB,, | Performed by: PSYCHIATRY & NEUROLOGY

## 2024-04-10 PROCEDURE — 3078F DIAST BP <80 MM HG: CPT | Mod: CPTII,S$GLB,, | Performed by: PSYCHIATRY & NEUROLOGY

## 2024-04-10 PROCEDURE — 99213 OFFICE O/P EST LOW 20 MIN: CPT | Mod: S$GLB,,, | Performed by: PSYCHIATRY & NEUROLOGY

## 2024-04-27 ENCOUNTER — OFFICE VISIT (OUTPATIENT)
Dept: URGENT CARE | Facility: CLINIC | Age: 19
End: 2024-04-27
Payer: COMMERCIAL

## 2024-04-27 VITALS
WEIGHT: 115.63 LBS | SYSTOLIC BLOOD PRESSURE: 116 MMHG | BODY MASS INDEX: 19.27 KG/M2 | HEART RATE: 87 BPM | TEMPERATURE: 98 F | DIASTOLIC BLOOD PRESSURE: 62 MMHG | HEIGHT: 65 IN | OXYGEN SATURATION: 100 %

## 2024-04-27 DIAGNOSIS — H60.332 ACUTE SWIMMER'S EAR OF LEFT SIDE: Primary | ICD-10-CM

## 2024-04-27 PROCEDURE — 99213 OFFICE O/P EST LOW 20 MIN: CPT | Mod: S$GLB,,,

## 2024-04-27 RX ORDER — NEOMYCIN SULFATE, POLYMYXIN B SULFATE AND HYDROCORTISONE 10; 3.5; 1 MG/ML; MG/ML; [USP'U]/ML
3 SUSPENSION/ DROPS AURICULAR (OTIC) 3 TIMES DAILY
Qty: 10 ML | Refills: 0 | Status: SHIPPED | OUTPATIENT
Start: 2024-04-27 | End: 2024-05-04

## 2024-04-27 NOTE — PROGRESS NOTES
"Subjective:      Patient ID: Andree Zavala is a 18 y.o. female.    Vitals:  height is 5' 5.43" (1.662 m) and weight is 52.4 kg (115 lb 10.1 oz). Her tympanic temperature is 98 °F (36.7 °C). Her blood pressure is 116/62 and her pulse is 87. Her oxygen saturation is 100%.     Chief Complaint: Otalgia    Andree Zavala is a 18 y.o. female who presents for L ear pain which onset 4 days ago. Denies drainage or hearing loss. Associated sxs include HA. States pain radiates to jaw. Patient denies any fever, chills, SOB, CP, abd pain, n/v/d, rash, dizziness, or numbness/tingling. Prior Tx includes ear drops and ibuprofen.    Otalgia   There is pain in the left ear. This is a new problem. The current episode started in the past 7 days. The problem occurs constantly. The problem has been gradually worsening. There has been no fever. The pain is at a severity of 7/10. The pain is moderate. Associated symptoms include headaches and neck pain. Pertinent negatives include no abdominal pain, coughing, diarrhea, ear discharge, hearing loss, rash, rhinorrhea, sore throat or vomiting. She has tried NSAIDs (ear drops) for the symptoms. The treatment provided mild relief.       Constitution: Negative for appetite change, chills, sweating, fatigue and fever.   HENT:  Positive for ear pain. Negative for ear discharge, hearing loss, congestion, postnasal drip, sinus pain, sinus pressure, sore throat and trouble swallowing.    Neck: Positive for neck pain.   Cardiovascular:  Negative for chest pain.   Respiratory:  Negative for cough, sputum production and shortness of breath.    Gastrointestinal:  Negative for abdominal pain, nausea, vomiting and diarrhea.   Musculoskeletal:  Negative for muscle ache.   Skin:  Negative for rash.   Neurological:  Positive for headaches. Negative for dizziness, numbness and tingling.      Objective:     Physical Exam   Constitutional: She is oriented to person, place, and time. She appears " well-developed. She is cooperative.  Non-toxic appearance. She does not appear ill. No distress.   HENT:   Head: Normocephalic and atraumatic.   Ears:   Right Ear: Hearing, tympanic membrane, external ear and ear canal normal. Tympanic membrane is not perforated, not erythematous and not bulging.   Left Ear: Hearing, tympanic membrane, external ear and ear canal normal. There is swelling and tenderness. No no drainage. Tympanic membrane is not perforated, not erythematous and not bulging.   Nose: Nose normal. No mucosal edema or nasal deformity. No epistaxis. Right sinus exhibits no maxillary sinus tenderness and no frontal sinus tenderness. Left sinus exhibits no maxillary sinus tenderness and no frontal sinus tenderness.   Mouth/Throat: Uvula is midline, oropharynx is clear and moist and mucous membranes are normal. Mucous membranes are moist. No trismus in the jaw. Normal dentition. No uvula swelling. No oropharyngeal exudate, posterior oropharyngeal edema or posterior oropharyngeal erythema.   Eyes: Conjunctivae and lids are normal. No scleral icterus. Extraocular movement intact   Neck: Trachea normal and phonation normal. Neck supple. No edema present. No erythema present. No neck rigidity present.   Cardiovascular: Normal rate, regular rhythm, normal heart sounds and normal pulses.   Pulmonary/Chest: Effort normal and breath sounds normal. No stridor. No respiratory distress. She has no decreased breath sounds. She has no wheezes. She has no rhonchi. She has no rales.   Abdominal: Normal appearance.   Musculoskeletal: Normal range of motion.         General: No deformity. Normal range of motion.   Neurological: She is alert and oriented to person, place, and time. She exhibits normal muscle tone. Coordination normal.   Skin: Skin is warm, dry, intact, not diaphoretic and not pale.   Psychiatric: Her speech is normal and behavior is normal. Judgment and thought content normal.   Nursing note and vitals  reviewed.      Assessment:     1. Acute swimmer's ear of left side        Plan:       Acute swimmer's ear of left side  -     neomycin-polymyxin-hydrocortisone (CORTISPORIN) 3.5-10,000-1 mg/mL-unit/mL-% otic suspension; Place 3 drops into the left ear 3 (three) times daily. for 7 days  Dispense: 10 mL; Refill: 0      Afebrile. VSS. Patient is in NAD.  Reviewed diagnosis of ear infection with patient who verbalized understanding.   Meds: cortisporin sent to preferred pharmacy.  Discussed over-the-counter medications to help with allergy symptoms as well.    Increase fluid intake and plenty of rest.  Tylenol/Ibuprofen (as permitted) as needed for any pain or discomfort.  If symptoms do not resolve, return to clinic for further evaluation.  Patient exits exam room in no acute distress.

## 2024-05-08 DIAGNOSIS — G35 MULTIPLE SCLEROSIS, RELAPSING-REMITTING: ICD-10-CM

## 2024-05-08 DIAGNOSIS — G47.429 NARCOLEPSY DUE TO UNDERLYING CONDITION WITHOUT CATAPLEXY: ICD-10-CM

## 2024-05-08 DIAGNOSIS — R53.83 FATIGUE, UNSPECIFIED TYPE: ICD-10-CM

## 2024-05-08 RX ORDER — MODAFINIL 100 MG/1
100 TABLET ORAL DAILY
Qty: 30 TABLET | Refills: 0 | Status: SHIPPED | OUTPATIENT
Start: 2024-05-08 | End: 2024-06-07

## 2024-05-08 NOTE — TELEPHONE ENCOUNTER
LOV: 04/10/24    Next Visit: 07/17/24      Patient also wants to know if the dose can be increased slightly, She said she is noticing its a little less effective than when she first began taking it.

## 2024-07-16 ENCOUNTER — PATIENT MESSAGE (OUTPATIENT)
Dept: NEUROLOGY | Facility: CLINIC | Age: 19
End: 2024-07-16
Payer: COMMERCIAL

## 2024-07-16 ENCOUNTER — TELEPHONE (OUTPATIENT)
Dept: NEUROLOGY | Facility: CLINIC | Age: 19
End: 2024-07-16
Payer: COMMERCIAL

## 2024-07-16 NOTE — TELEPHONE ENCOUNTER
----- Message from Elena Wright sent at 7/16/2024  2:31 PM CDT -----  Type:  Patient Returning Call    Who Called:pt   Who Left Message for Patient:Stephania   Does the patient know what this is regarding?:her appt on tomorrow/she would like to do the Virtual Appt  Would the patient rather a call back or a response via [facility]? call  Best Call Back Number: [phone]  Additional Information: .    Thank you

## 2024-07-16 NOTE — PROGRESS NOTES
Subjective:       Patient ID: Andree Zavala is a 19 y.o. female.    Chief Complaint: No chief complaint on file.      HPI    The patient presented on 04/ 2024 for evaluation of MS.   New issues: no flare up.   Headaches: tension type daily since started school.   Tiredness fatigue in the afternoon - naps necessary / interfere with school.       The originating site (patient location) is: Home.    The distant site (neurologist location) is: Neurology Clinic at [facility].    The chief complaint leading to consultation is: MS.     Visit type: Virtual visit with synchronous audio and video.    Consent: The patient verbally consented to participating in the video visit and informed that may   decline to receive medical services by telemedicine and may withdraw from such care at any time.    I discussed with the patient the nature of our telemedicine visits, that:    I  would evaluate the patient and recommend diagnostics and treatments based on my assessment.    Our sessions are not being recorded and that personal health information is protected.    Our team would provide follow up care in person if/when the patient needs it.    Virtual (video/telemedicine) visits have significant limitations. A telemedicine exam is primarily focused on the history and what I can observe.   Several critical parts of the neurological exam cannot be performed.     Review of Systems          Current Outpatient Medications:     dimethyl fumarate 240 mg CpDR, Take 240 mg by mouth 2 (two) times daily., Disp: 60 capsule, Rfl: 3    fluconazole (DIFLUCAN) 150 MG Tab, Take 1 tab PO Q 3 days (Patient not taking: Reported on 6/27/2024), Disp: 2 tablet, Rfl: 0    gabapentin (NEURONTIN) 300 MG capsule, Take 300 mg by mouth 3 (three) times daily. (Patient not taking: Reported on 6/27/2024), Disp: , Rfl:     meclizine (ANTIVERT) 12.5 mg tablet, Take 12.5 mg by mouth 2 (two) times daily. (Patient not taking: Reported on 6/27/2024), Disp: ,  Rfl:     medroxyPROGESTERone (PROVERA) 10 MG tablet, Take 1 tablet (10 mg total) by mouth once daily., Disp: 10 tablet, Rfl: 0    modafiniL (PROVIGIL) 100 MG Tab, Take 1 tablet (100 mg total) by mouth once daily., Disp: 30 tablet, Rfl: 0    norelgestromin-ethinyl estradiol (XULANE) 150-35 mcg/24 hr, APPLY 1 PATCH TOPICALLY TO THE SKIN 1 TIME A WEEK, Disp: 3 patch, Rfl: 11    nortriptyline (PAMELOR) 75 MG Cap, Take 75 mg by mouth. (Patient not taking: Reported on 6/27/2024), Disp: , Rfl:     ondansetron (ZOFRAN-ODT) 4 MG TbDL, DISSOLVE 1 TABLET ON THE TONGUE EVERY 12 HOURS AS NEEDED FOR NAUSEA, Disp: , Rfl:     Past Medical History:   Diagnosis Date    Asthma     Bipolar disorder, unspecified        Past Surgical History:   Procedure Laterality Date    ADENOIDECTOMY      TONSILLECTOMY         Social History     Socioeconomic History    Marital status: Single   Tobacco Use    Smoking status: Never     Passive exposure: Never    Smokeless tobacco: Never   Substance and Sexual Activity    Alcohol use: No    Drug use: Never    Sexual activity: Yes     Partners: Male     Birth control/protection: Condom, Patch     Comment: Birth control pills     Social Determinants of Health     Financial Resource Strain: Low Risk  (12/29/2023)    Overall Financial Resource Strain (CARDIA)     Difficulty of Paying Living Expenses: Not very hard   Food Insecurity: No Food Insecurity (12/29/2023)    Hunger Vital Sign     Worried About Running Out of Food in the Last Year: Never true     Ran Out of Food in the Last Year: Never true   Transportation Needs: No Transportation Needs (12/29/2023)    PRAPARE - Transportation     Lack of Transportation (Medical): No     Lack of Transportation (Non-Medical): No   Physical Activity: Inactive (12/29/2023)    Exercise Vital Sign     Days of Exercise per Week: 0 days     Minutes of Exercise per Session: 0 min   Stress: No Stress Concern Present (12/29/2023)    [employer] -  Occupational Stress Questionnaire     Feeling of Stress : Only a little   Housing Stability: Low Risk  (12/29/2023)    Housing Stability Vital Sign     Unable to Pay for Housing in the Last Year: No     Number of Places Lived in the Last Year: 2     Unstable Housing in the Last Year: No       Past/Current Medical/Surgical History, Past/Current Social History, Past/Current Family History and Past/Current Medications were reviewed in detail.    Objective:     GENERAL APPEARANCE:     The patient looks comfortable.    No signs of respiratory distress.    Normal breathing pattern.    No dysmorphic features    Normal eye contact.     GENERAL MEDICAL EXAM:    HEENT:  Head is atraumatic normocephalic.      Neck and Axillae: No JVD. No visible lesions.    Cardiopulmonary: No cyanosis. No tachypnea. Normal respiratory effort.    Gastrointestinal/Urogenital:  No jaundice. No stomas or lesions. No visible hernias. No catheters.     Skin, Hair and Nails: No pathognonomic skin rash. No neurofibromatosis. No visible lesions.No stigmata of autoimmune disease. No clubbing.    Limbs: No varicose veins. No visible swelling.    Muskoskeletal: No visible deformities.No visible lesions.         Neurologic Exam    Lab Results   Component Value Date    WBC 5.25 03/01/2024    HGB 12.1 03/01/2024    HCT 37.1 03/01/2024    MCV 87 03/01/2024     03/01/2024       Sodium   Date Value Ref Range Status   03/01/2024 139 136 - 145 mmol/L Final     Potassium   Date Value Ref Range Status   03/01/2024 3.9 3.5 - 5.1 mmol/L Final     Chloride   Date Value Ref Range Status   03/01/2024 108 95 - 110 mmol/L Final     CO2   Date Value Ref Range Status   03/01/2024 21 (L) 23 - 29 mmol/L Final     Glucose   Date Value Ref Range Status   03/01/2024 105 70 - 110 mg/dL Final     BUN   Date Value Ref Range Status   03/01/2024 9 6 - 20 mg/dL Final     Creatinine   Date Value Ref Range Status   03/01/2024 0.7 0.5 - 1.4 mg/dL Final     Calcium   Date Value  Ref Range Status   03/01/2024 9.6 8.7 - 10.5 mg/dL Final     Total Protein   Date Value Ref Range Status   03/01/2024 8.2 6.0 - 8.4 g/dL Final     Albumin   Date Value Ref Range Status   03/01/2024 4.0 3.5 - 5.2 g/dL Final     Total Bilirubin   Date Value Ref Range Status   03/01/2024 0.3 0.1 - 1.0 mg/dL Final     Comment:     For infants and newborns, interpretation of results should be based  on gestational age, weight and in agreement with clinical  observations.    Premature Infant recommended reference ranges:  Up to 24 hours.............<8.0 mg/dL  Up to 48 hours............<12.0 mg/dL  3-5 days..................<15.0 mg/dL  6-29 days.................<15.0 mg/dL       Alkaline Phosphatase   Date Value Ref Range Status   03/01/2024 51 (L) 55 - 135 U/L Final     AST   Date Value Ref Range Status   03/01/2024 14 10 - 40 U/L Final     ALT   Date Value Ref Range Status   03/01/2024 11 10 - 44 U/L Final     Anion Gap   Date Value Ref Range Status   03/01/2024 10 8 - 16 mmol/L Final     eGFR if    Date Value Ref Range Status   02/17/2016 SEE COMMENT >60 mL/min/1.73 m^2 Final     eGFR if non    Date Value Ref Range Status   02/17/2016 SEE COMMENT >60 mL/min/1.73 m^2 Final     Comment:     Calculation used to obtain the estimated glomerular filtration  rate (eGFR) is the CKD-EPI equation. Since race is unknown   in our information system, the eGFR values for   -American and Non--American patients are given   for each creatinine result.  Test not performed.  GFR calculation is only valid for patients   18 and older.         Lab Results   Component Value Date    ZDSFROAW49 472 10/14/2023       Lab Results   Component Value Date    TSH 2.470 10/14/2023    FREET4 0.87 10/14/2023       No results found in the last 24 hours.    No results found in the last 24 hours.    LABORATORY EVALUATION    RADIOLOGY EVALUATION     NEUROPHYSIOLOGY EVALUATION     PATHOLOGY EVALUATION       NEUROCOGNITIVE AND NEUROPSYCHOLOGY EVALUATION     Reviewed the neuroimaging independently     Assessment:   Patient Neurological Assessment is non focal.   - Multiple Sclerosis.   - Narcolepsy W/ o Cataplexy.      Plan:   Stable.   Tension type Headaches - started since back to school / daily / intensity 3 to 5/ 10 / no sensory symptoms / NSAID's PRN.  Increase Modafinil to BID.    Add Topamax 25 mg PO nightly.  Side effects discussed.    MS specialist - pending.      Patient symptoms ( Sleep Paralysis / Hypnagogic Hallucinations ) are typical of Narcolepsy /  appears no having Cataplexia according to Her Hx.      Increase Provigil 100 mg PO daily to BID.      LP: OP - 19 Cm H2O.  CSF studies: Glucose / Total Protein / VDRL / ACE - normal parameters.      Cells count:   Heme Aliquot mL 2.0   Appearance, CSF Clear Clear   Color, CSF Colorless Colorless   WBC, CSF 0 - 5 /cu mm 39 High    RBC, CSF 0 /cu mm 4 Abnormal    Lymphs, CSF 40 - 80 % 97 High    Mono/Macrophage, CSF          OLGC band: 10 (  < 2 )   NMO Aqua porin: negative.      Personally reviewed MRI Brain / Cervical / Thoracic spines W and W/ o contrast - done  / 2023 - demyelinating lesions ( Brain / Cervical / Thoracic spine )   typical for MS.   Labs: CBC / CMP / RPR / TSH / Free T 4 / Pregnancy test / Hgb A 1 C / Lipids panel done 10/ 2023 - non significant abnormalities.      MEDICAL/SURGICAL COMORBIDITIES     All relevant medical comorbidities noted and managed by primary care physician and medical care team.      HEALTHY LIFESTYLE AND PREVENTATIVE CARE    The patient to adhere to the age-appropriate health maintenance guidelines including screening tests and vaccinations.   The patient to adhere to  healthy lifestyle, optimal weight, exercise, healthy diet,   good sleep hygiene and avoiding drugs including smoking, alcohol and recreational drugs.    RTC     I spent a total of 20 minutes on the day of the visit.This includes face to face time and  non-face to face time preparing to see the patient (eg, review of tests),   obtaining and/or reviewing separately obtained history, documenting clinical information in the electronic or other health record,   independently interpreting results and communicating results to the patient/family/caregiver, or care coordinator.    Please do not hesitate to contact me with any updates, questions or concerns.    Jeff Rodríguez MD.  General Neurologist.

## 2024-07-17 ENCOUNTER — OFFICE VISIT (OUTPATIENT)
Dept: NEUROLOGY | Facility: CLINIC | Age: 19
End: 2024-07-17
Payer: COMMERCIAL

## 2024-07-17 DIAGNOSIS — G47.429 NARCOLEPSY DUE TO UNDERLYING CONDITION WITHOUT CATAPLEXY: ICD-10-CM

## 2024-07-17 DIAGNOSIS — G35 MULTIPLE SCLEROSIS, RELAPSING-REMITTING: Primary | ICD-10-CM

## 2024-07-17 DIAGNOSIS — R53.83 FATIGUE, UNSPECIFIED TYPE: ICD-10-CM

## 2024-07-17 PROCEDURE — 99213 OFFICE O/P EST LOW 20 MIN: CPT | Mod: 95,,, | Performed by: PSYCHIATRY & NEUROLOGY

## 2024-07-17 RX ORDER — DIMETHYL FUMARATE 240 MG/1
240 CAPSULE ORAL 2 TIMES DAILY
Qty: 60 CAPSULE | Refills: 5 | Status: SHIPPED | OUTPATIENT
Start: 2024-07-17

## 2024-07-17 RX ORDER — MODAFINIL 100 MG/1
100 TABLET ORAL 2 TIMES DAILY
Qty: 60 TABLET | Refills: 0 | Status: SHIPPED | OUTPATIENT
Start: 2024-07-17 | End: 2024-08-16

## 2024-07-18 ENCOUNTER — PATIENT MESSAGE (OUTPATIENT)
Dept: NEUROLOGY | Facility: CLINIC | Age: 19
End: 2024-07-18
Payer: COMMERCIAL

## 2024-07-18 DIAGNOSIS — R51.9 PERSISTENT HEADACHES: Primary | ICD-10-CM

## 2024-07-18 RX ORDER — TOPIRAMATE 25 MG/1
25 TABLET ORAL 2 TIMES DAILY
Qty: 30 TABLET | Refills: 3 | Status: SHIPPED | OUTPATIENT
Start: 2024-07-18 | End: 2024-09-16

## 2024-07-19 ENCOUNTER — TELEPHONE (OUTPATIENT)
Dept: NEUROLOGY | Facility: CLINIC | Age: 19
End: 2024-07-19
Payer: COMMERCIAL

## 2024-07-19 NOTE — TELEPHONE ENCOUNTER
Neuro referral, notes and MRIs in Epic. Message to Meeta to sched new MS pt with Dr Duckworth or Dr Cloud.

## 2024-07-24 ENCOUNTER — TELEPHONE (OUTPATIENT)
Dept: NEUROLOGY | Facility: CLINIC | Age: 19
End: 2024-07-24
Payer: COMMERCIAL

## 2024-07-25 ENCOUNTER — TELEPHONE (OUTPATIENT)
Dept: NEUROLOGY | Facility: CLINIC | Age: 19
End: 2024-07-25
Payer: COMMERCIAL

## 2024-07-25 NOTE — TELEPHONE ENCOUNTER
----- Message from Meeta Garza sent at 7/24/2024  2:52 PM CDT -----  Regarding: Left Message  Left Vm for this one and set a reminder to follow up

## 2024-08-01 ENCOUNTER — OFFICE VISIT (OUTPATIENT)
Dept: OTOLARYNGOLOGY | Facility: CLINIC | Age: 19
End: 2024-08-01
Payer: COMMERCIAL

## 2024-08-01 VITALS — BODY MASS INDEX: 19.5 KG/M2 | HEIGHT: 65 IN | WEIGHT: 117.06 LBS

## 2024-08-01 DIAGNOSIS — R04.0 EPISTAXIS: ICD-10-CM

## 2024-08-01 DIAGNOSIS — H61.22 IMPACTED CERUMEN OF LEFT EAR: ICD-10-CM

## 2024-08-01 DIAGNOSIS — H60.543 DERMATITIS OF BOTH EAR CANALS: ICD-10-CM

## 2024-08-01 DIAGNOSIS — H69.00 PATULOUS EUSTACHIAN TUBE, UNSPECIFIED LATERALITY: Primary | ICD-10-CM

## 2024-08-01 PROCEDURE — 99999 PR PBB SHADOW E&M-EST. PATIENT-LVL III: CPT | Mod: PBBFAC,,, | Performed by: STUDENT IN AN ORGANIZED HEALTH CARE EDUCATION/TRAINING PROGRAM

## 2024-08-01 RX ORDER — FLUOCINOLONE ACETONIDE 0.11 MG/ML
3 OIL AURICULAR (OTIC) 2 TIMES DAILY
Qty: 20 ML | Refills: 0 | Status: SHIPPED | OUTPATIENT
Start: 2024-08-01 | End: 2024-08-08

## 2024-08-01 NOTE — PROGRESS NOTES
Chief complaint:    Chief Complaint   Patient presents with    Epistaxis     Pt has come in due to off and on bleeds in the nose (right side only )  Last bleed was   lasted 5 mins     Pt states that her throat is draining into her ears              Referring Provider:  Self, Aaareferral  No address on file      History of present illness:     Ms. Zavala is a 19 y.o. presenting for evaluation of epistaxis.     Onset of bleeding: years  Frequency of nose bleeds: on and off , usually daily  Laterality:  right  Volume of bleedin-20 minutes  Ever required a blood transfusion or ER visit due to nose bleeds:  yes, as a child, not recently  Primarily Posterior Bleeding:  Yes  History of coagulation disorders/bleeding when having teeth cleaning:  No  Currently on anticoagulant medications:   NO  History of HTN: no  Blood pressure:   BP Readings from Last 3 Encounters:   24 118/78   24 116/62   04/10/24 120/72     Exacerbating factors: blowing, manipulation  Treatments have included: tissue  Had a cauterization when she was young which helped for a while     Ear   The patient endorses right > left ear fullness. Onset of this chief complaint was about 2 years ago.  Additional symptoms that also have been associated are aural fullness, autophony, hearing her own breathing. No popping.    Ear itching as well.      History      Past Medical History:   Past Medical History:   Diagnosis Date    Asthma     Bipolar disorder, unspecified          Past Surgical History:  Past Surgical History:   Procedure Laterality Date    ADENOIDECTOMY      TONSILLECTOMY           Medications: Medication list reviewed. She  has a current medication list which includes the following prescription(s): dimethyl fumarate, fluocinolone acetonide oil, modafinil, norelgestromin-ethinyl estradiol, ondansetron, and topiramate.     Allergies: Review of patient's allergies indicates:  No Known Allergies      Family history: family history  "includes Hypertension in her father.         Social History          Alcohol use:  reports no history of alcohol use.            Tobacco:  reports that she has never smoked. She has never been exposed to tobacco smoke. She has never used smokeless tobacco.         Physical Examination      Vitals: Height 5' 5" (1.651 m), weight 53.1 kg (117 lb 1 oz).      General: Well developed, well nourished, well hydrated.     Voice: no hoarseness, no dysarthria      Head/Face: Normocephalic, atraumatic. No scars or lesions. Facial musculature equal.     Eyes: No scleral icterus or conjunctival hemorrhage. EOMI. PERRLA.     Ears: after disimpaction    Right ear: No gross deformity. EAC is clear of debris and erythema. TM are intact with a pneumatized middle ear. No signs of retraction, fluid or infection.      Left ear: No gross deformity. EAC is clear of debris and erythema. TM are intact with a pneumatized middle ear. No signs of retraction, fluid or infection.      Nose: No gross deformity or lesions. No purulent discharge. No significant NSD.      Mouth/Oropharynx: Lips without any lesions. No mucosal lesions within the oropharynx. No tonsillar exudate or lesions. Pharyngeal walls symmetrical. Uvula midline. Tongue midline without lesions.     Neck: Trachea midline. No masses. No thyromegaly or nodules palpated.     Lymphatic: No lymphadenopathy in the neck.     Extremities: No cyanosis. Warm and well-perfused.     Skin: No scars or lesions on face or neck.      Neurologic: Moving all extremities without gross abnormality.CN II-XII grossly intact. House-Brackmann 1/6. No signs of nystagmus.          Data reviewed      Review of records:      I reviewed records from the referring provider's office visits, describing the history, workup, and/or treatment of this problem thus far.    Procedures:    Procedure - control of epistaxis, anterior, simple    Description: Risks, benefits, and alternatives of the procedure were " discussed with the patient, and the patient consented to the control of epistaxis.  The nasal cavity was sprayed with a topical decongestant and topical anesthetic. After adequate anesthesia was obtained, control of epistaxis was performed for the right side(s).  The concerning area(s) for bleeding were addressed with limited silver nitrate applied topically to the area(s) and at the end of the procedure there was no further bleeding from the nose and sinuses.  The patient tolerated the procedure well with no complications.    Nasal Findings  -     The area(s) causing the epistaxis (and cauterized today) were found to be arising from prominent blood vessels located at the caudal septum in Kiesselbach's plexus (Little's area) on the right side.     Procedure: ear microscopy with removal of cerumen    Description: The patient was in agreement with the examination and debridement of the ears. Removal of the cerumen required use of an operating microscope and multiple micro-instruments.     With the patient in the supine position, we used the operating microscope to examine both ears with the appropriate sized ear speculum.  A variety of sterile, micro-instruments were utilized to remove the cerumen atraumatically.  I performed the procedure which required a significant amount of time and effort. The tympanic membrane was then well visualized.  The patient tolerated the procedure well and there were no complications.    Findings:   Left ear had significant wax along the TM, the EAC was normal, and the tympanic membrane was intact with no evidence of middle ear fluid.    Assessment/Plan:    1. Patulous Eustachian tube, unspecified laterality    2. Epistaxis    3. Dermatitis of both ear canals    4. Impacted cerumen of left ear         Andree has right sided epistaxis. Cauterization was deemed necessary at the current clinic visit.    We discussed preventive measures such as the application of moisturizing gel to the  nose (ie. AYR nasal gel) at night and saline spray in the daytime.  We discussed other issues which can cause recurrence of nosebleeds, such as NSAIDs, aggressive nose blowing/wiping, etc.  If there is further bleeding she should return to the clinic for re-evaluation.  Conservative measures for nosebleeds include pinching nose, ice to area, and Afrin.  We will see Andree back if there are further issues.    Ear fullness  Sounds most consistent with patulous Eustachian Tube Dysfunction  Recommend audio with ET function testing  If consistent will consider PET      Avi Keane MD  [facility]   [facility]  [id number] [place] [address].  [place] [address]  P: [phone]  F: [phone]

## 2024-08-06 DIAGNOSIS — G35 MULTIPLE SCLEROSIS, RELAPSING-REMITTING: ICD-10-CM

## 2024-08-06 RX ORDER — DIMETHYL FUMARATE 240 MG/1
240 CAPSULE ORAL 2 TIMES DAILY
Qty: 60 CAPSULE | Refills: 5 | Status: SHIPPED | OUTPATIENT
Start: 2024-08-06

## 2024-08-12 ENCOUNTER — OFFICE VISIT (OUTPATIENT)
Dept: OTOLARYNGOLOGY | Facility: CLINIC | Age: 19
End: 2024-08-12
Payer: COMMERCIAL

## 2024-08-12 ENCOUNTER — CLINICAL SUPPORT (OUTPATIENT)
Dept: AUDIOLOGY | Facility: CLINIC | Age: 19
End: 2024-08-12
Payer: COMMERCIAL

## 2024-08-12 VITALS — HEIGHT: 65 IN | BODY MASS INDEX: 19.48 KG/M2

## 2024-08-12 DIAGNOSIS — H69.01 PATULOUS EUSTACHIAN TUBE OF RIGHT EAR: Primary | ICD-10-CM

## 2024-08-12 DIAGNOSIS — H69.01 PATULOUS EUSTACHIAN TUBE, RIGHT: Primary | ICD-10-CM

## 2024-08-12 PROCEDURE — 92557 COMPREHENSIVE HEARING TEST: CPT | Mod: S$GLB,,, | Performed by: AUDIOLOGIST-HEARING AID FITTER

## 2024-08-12 PROCEDURE — 99999 PR PBB SHADOW E&M-EST. PATIENT-LVL I: CPT | Mod: PBBFAC,,, | Performed by: AUDIOLOGIST-HEARING AID FITTER

## 2024-08-12 PROCEDURE — 3008F BODY MASS INDEX DOCD: CPT | Mod: CPTII,S$GLB,, | Performed by: STUDENT IN AN ORGANIZED HEALTH CARE EDUCATION/TRAINING PROGRAM

## 2024-08-12 PROCEDURE — 92550 TYMPANOMETRY & REFLEX THRESH: CPT | Mod: S$GLB,,, | Performed by: AUDIOLOGIST-HEARING AID FITTER

## 2024-08-12 PROCEDURE — 1159F MED LIST DOCD IN RCRD: CPT | Mod: CPTII,S$GLB,, | Performed by: STUDENT IN AN ORGANIZED HEALTH CARE EDUCATION/TRAINING PROGRAM

## 2024-08-12 PROCEDURE — 99999 PR PBB SHADOW E&M-EST. PATIENT-LVL III: CPT | Mod: PBBFAC,,, | Performed by: STUDENT IN AN ORGANIZED HEALTH CARE EDUCATION/TRAINING PROGRAM

## 2024-08-12 PROCEDURE — 69420 INCISION OF EARDRUM: CPT | Mod: RT,S$GLB,, | Performed by: STUDENT IN AN ORGANIZED HEALTH CARE EDUCATION/TRAINING PROGRAM

## 2024-08-12 PROCEDURE — 99213 OFFICE O/P EST LOW 20 MIN: CPT | Mod: 25,S$GLB,, | Performed by: STUDENT IN AN ORGANIZED HEALTH CARE EDUCATION/TRAINING PROGRAM

## 2024-08-12 NOTE — PROGRESS NOTES
Referring provider: Dr. Diya Candelario HERNÁN Zavala was seen 08/12/2024 for an audiological evaluation.    History of present illness 8/1/24:      The patient endorses right > left ear fullness. Onset of this chief complaint was about 2 years ago.  Additional symptoms that also have been associated are aural fullness, autophony, hearing her own breathing. No popping.     Ear itching as well.     Return clinic visit,  8/12/24  Overall symptoms have been about the same, but not for the last 2 days. No hearing loss. No tinnitus. Has dizziness on occasion described as rocking as if on a boat. Has medical history of multiple sclerosis.    Audiology Report:  Results reveal normal hearing 125 Hz-8000 Hz for the right ear, and normal hearing 125-8000 Hz for the left ear.   Speech Reception Thresholds were 5 dBHL for the right ear and 10 dBHL for the left ear.   Word recognition scores were excellent for the right ear and excellent for the left ear.   Tympanograms were Type A for the right ear and Type A for the left ear. Patulous eustachian tube test was negative for the right ear and negative for the left ear. Of note, patient has been asymptomatic for the past two days. Ipsilateral acoustic reflexes were normal for the right ear and normal for the left ear. Contralateral acoustic reflexes were present 500-2000 Hz, absent 4000 Hz for the right ear and absent 500-1000 Hz and present 4973-5637 Hz for the left ear.     Patient was counseled on the above findings.    Recommendations:  ENT

## 2024-08-12 NOTE — PROGRESS NOTES
"Chief complaint:    Chief Complaint   Patient presents with    Follow-up     Audio review           Referring Provider:  No referring provider defined for this encounter.      History of present illness 8/1/24:     The patient endorses right > left ear fullness. Onset of this chief complaint was about 2 years ago.  Additional symptoms that also have been associated are aural fullness, autophony, hearing her own breathing. No popping.    Ear itching as well.    Return clinic visit,  8/12/24  Overall symptoms have been about the same, but not for the last 2 days      History      Past Medical History:   Past Medical History:   Diagnosis Date    Asthma     Bipolar disorder, unspecified          Past Surgical History:  Past Surgical History:   Procedure Laterality Date    ADENOIDECTOMY      TONSILLECTOMY           Medications: Medication list reviewed. She  has a current medication list which includes the following prescription(s): dimethyl fumarate, modafinil, norelgestromin-ethinyl estradiol, ondansetron, and topiramate.     Allergies: Review of patient's allergies indicates:  No Known Allergies      Family history: family history includes Hypertension in her father.         Social History          Alcohol use:  reports no history of alcohol use.            Tobacco:  reports that she has never smoked. She has never been exposed to tobacco smoke. She has never used smokeless tobacco.         Physical Examination      Vitals: Height 5' 5" (1.651 m).      General: Well developed, well nourished, well hydrated.     Voice: no hoarseness, no dysarthria      Head/Face: Normocephalic, atraumatic. No scars or lesions. Facial musculature equal.     Eyes: No scleral icterus or conjunctival hemorrhage. EOMI. PERRLA.     Ears: after disimpaction    Right ear: No gross deformity. EAC is clear of debris and erythema. TM are intact with a pneumatized middle ear. No signs of retraction, fluid or infection.      Left ear: No gross " deformity. EAC is clear of debris and erythema. TM are intact with a pneumatized middle ear. No signs of retraction, fluid or infection.      Nose: No gross deformity or lesions. No purulent discharge. No significant NSD.      Mouth/Oropharynx: Lips without any lesions. No mucosal lesions within the oropharynx. No tonsillar exudate or lesions. Pharyngeal walls symmetrical. Uvula midline. Tongue midline without lesions.     Neck: Trachea midline. No masses. No thyromegaly or nodules palpated.     Lymphatic: No lymphadenopathy in the neck.     Extremities: No cyanosis. Warm and well-perfused.     Skin: No scars or lesions on face or neck.      Neurologic: Moving all extremities without gross abnormality.CN II-XII grossly intact. House-Brackmann 1/6. No signs of nystagmus.          Data reviewed      Review of records:      I reviewed records from the referring provider's office visits, describing the history, workup, and/or treatment of this problem thus far.      Normal hearing  Type A tymps  Negative patulous testing      Diagnostic/therapeutic myringotomy    Diagnosis: patulous Eustachian Tube Dysfunction, right     DETAILS OF PROCEDURE:   Obstructing cerumen was removed from the right ear canal using magnification and appropriate instrumentation. The middle ear was inspected under the microscope. . A joint decision was made to proceed with a myringotomy.  With the TM fully in view, the tympanic membrane was anesthetized and a radial incision in the anterior/inferior quadrant.  The middle ear cavity was suctioned. There was no effusion. The patient tolerated the procedure well.    Assessment/Plan:    1. Patulous eustachian tube of right ear           history most consistent with patulous Eustachian Tube Dysfunction  Though testing for patulous ET was negative today, she is currently asymptomatic. We discussed options and she elected for trial myringotomy   Will let us know if symptoms improve over the next week, if  they do we will plan for tube  Otherwise f/u 1 month           Avi Keane MD  [facility]   [facility]  [address] [facility].  [place] [address]  P: [phone]  F: [phone]

## 2024-08-14 ENCOUNTER — TELEPHONE (OUTPATIENT)
Dept: NEUROLOGY | Facility: CLINIC | Age: 19
End: 2024-08-14
Payer: COMMERCIAL

## 2024-08-30 ENCOUNTER — LAB VISIT (OUTPATIENT)
Dept: LAB | Facility: HOSPITAL | Age: 19
End: 2024-08-30
Attending: STUDENT IN AN ORGANIZED HEALTH CARE EDUCATION/TRAINING PROGRAM
Payer: COMMERCIAL

## 2024-08-30 ENCOUNTER — OFFICE VISIT (OUTPATIENT)
Dept: NEUROLOGY | Facility: CLINIC | Age: 19
End: 2024-08-30
Payer: COMMERCIAL

## 2024-08-30 VITALS
HEART RATE: 105 BPM | WEIGHT: 117.31 LBS | HEIGHT: 65 IN | DIASTOLIC BLOOD PRESSURE: 86 MMHG | BODY MASS INDEX: 19.54 KG/M2 | SYSTOLIC BLOOD PRESSURE: 133 MMHG

## 2024-08-30 DIAGNOSIS — G35 MULTIPLE SCLEROSIS, RELAPSING-REMITTING: ICD-10-CM

## 2024-08-30 DIAGNOSIS — F32.A DEPRESSION, UNSPECIFIED DEPRESSION TYPE: Primary | ICD-10-CM

## 2024-08-30 LAB
25(OH)D3+25(OH)D2 SERPL-MCNC: 27 NG/ML (ref 30–96)
ALBUMIN SERPL BCP-MCNC: 4 G/DL (ref 3.5–5.2)
ALP SERPL-CCNC: 50 U/L (ref 55–135)
ALT SERPL W/O P-5'-P-CCNC: 8 U/L (ref 10–44)
ANION GAP SERPL CALC-SCNC: 7 MMOL/L (ref 8–16)
AST SERPL-CCNC: 11 U/L (ref 10–40)
BASOPHILS # BLD AUTO: 0.03 K/UL (ref 0–0.2)
BASOPHILS NFR BLD: 0.3 % (ref 0–1.9)
BILIRUB SERPL-MCNC: 0.3 MG/DL (ref 0.1–1)
BUN SERPL-MCNC: 12 MG/DL (ref 6–20)
CALCIUM SERPL-MCNC: 10 MG/DL (ref 8.7–10.5)
CHLORIDE SERPL-SCNC: 105 MMOL/L (ref 95–110)
CO2 SERPL-SCNC: 26 MMOL/L (ref 23–29)
CREAT SERPL-MCNC: 0.7 MG/DL (ref 0.5–1.4)
DIFFERENTIAL METHOD BLD: ABNORMAL
EOSINOPHIL # BLD AUTO: 0.1 K/UL (ref 0–0.5)
EOSINOPHIL NFR BLD: 0.8 % (ref 0–8)
ERYTHROCYTE [DISTWIDTH] IN BLOOD BY AUTOMATED COUNT: 14.6 % (ref 11.5–14.5)
EST. GFR  (NO RACE VARIABLE): >60 ML/MIN/1.73 M^2
GLUCOSE SERPL-MCNC: 113 MG/DL (ref 70–110)
HCT VFR BLD AUTO: 36.4 % (ref 37–48.5)
HGB BLD-MCNC: 11.4 G/DL (ref 12–16)
IMM GRANULOCYTES # BLD AUTO: 0.04 K/UL (ref 0–0.04)
IMM GRANULOCYTES NFR BLD AUTO: 0.4 % (ref 0–0.5)
LYMPHOCYTES # BLD AUTO: 3.2 K/UL (ref 1–4.8)
LYMPHOCYTES NFR BLD: 33.3 % (ref 18–48)
MCH RBC QN AUTO: 27.8 PG (ref 27–31)
MCHC RBC AUTO-ENTMCNC: 31.3 G/DL (ref 32–36)
MCV RBC AUTO: 89 FL (ref 82–98)
MONOCYTES # BLD AUTO: 0.9 K/UL (ref 0.3–1)
MONOCYTES NFR BLD: 9.5 % (ref 4–15)
NEUTROPHILS # BLD AUTO: 5.3 K/UL (ref 1.8–7.7)
NEUTROPHILS NFR BLD: 55.7 % (ref 38–73)
NRBC BLD-RTO: 0 /100 WBC
PLATELET # BLD AUTO: 310 K/UL (ref 150–450)
PMV BLD AUTO: 11.1 FL (ref 9.2–12.9)
POTASSIUM SERPL-SCNC: 3.9 MMOL/L (ref 3.5–5.1)
PROT SERPL-MCNC: 8.2 G/DL (ref 6–8.4)
RBC # BLD AUTO: 4.1 M/UL (ref 4–5.4)
SODIUM SERPL-SCNC: 138 MMOL/L (ref 136–145)
WBC # BLD AUTO: 9.47 K/UL (ref 3.9–12.7)

## 2024-08-30 PROCEDURE — 86359 T CELLS TOTAL COUNT: CPT | Performed by: STUDENT IN AN ORGANIZED HEALTH CARE EDUCATION/TRAINING PROGRAM

## 2024-08-30 PROCEDURE — 85025 COMPLETE CBC W/AUTO DIFF WBC: CPT | Performed by: STUDENT IN AN ORGANIZED HEALTH CARE EDUCATION/TRAINING PROGRAM

## 2024-08-30 PROCEDURE — 82306 VITAMIN D 25 HYDROXY: CPT | Performed by: STUDENT IN AN ORGANIZED HEALTH CARE EDUCATION/TRAINING PROGRAM

## 2024-08-30 PROCEDURE — 86053 AQAPRN-4 ANTB FLO CYTMTRY EA: CPT | Performed by: STUDENT IN AN ORGANIZED HEALTH CARE EDUCATION/TRAINING PROGRAM

## 2024-08-30 PROCEDURE — 86711 JOHN CUNNINGHAM ANTIBODY: CPT | Performed by: STUDENT IN AN ORGANIZED HEALTH CARE EDUCATION/TRAINING PROGRAM

## 2024-08-30 PROCEDURE — 80053 COMPREHEN METABOLIC PANEL: CPT | Performed by: STUDENT IN AN ORGANIZED HEALTH CARE EDUCATION/TRAINING PROGRAM

## 2024-08-30 PROCEDURE — 86360 T CELL ABSOLUTE COUNT/RATIO: CPT | Performed by: STUDENT IN AN ORGANIZED HEALTH CARE EDUCATION/TRAINING PROGRAM

## 2024-08-30 PROCEDURE — 99999 PR PBB SHADOW E&M-EST. PATIENT-LVL V: CPT | Mod: PBBFAC,,, | Performed by: STUDENT IN AN ORGANIZED HEALTH CARE EDUCATION/TRAINING PROGRAM

## 2024-08-30 PROCEDURE — 0361U NEURFLMNT LT CHN DIG IA QUAN: CPT | Performed by: STUDENT IN AN ORGANIZED HEALTH CARE EDUCATION/TRAINING PROGRAM

## 2024-08-30 PROCEDURE — 86363 MOG-IGG1 ANTB FLO CYTMTRY EA: CPT | Performed by: STUDENT IN AN ORGANIZED HEALTH CARE EDUCATION/TRAINING PROGRAM

## 2024-08-30 PROCEDURE — 36415 COLL VENOUS BLD VENIPUNCTURE: CPT | Performed by: STUDENT IN AN ORGANIZED HEALTH CARE EDUCATION/TRAINING PROGRAM

## 2024-08-30 NOTE — PATIENT INSTRUCTIONS
[email or web address]      Supplements for Migraine Prevention:  CoQ10 300mg/day  Riboflavin (Vitamin B2) 400mg/day  Magnesium citrate 400mg/day    For more information, click here    Schedule MRI's at [facility] - [phone]

## 2024-08-30 NOTE — PROGRESS NOTES
[facility]  New Patient Visit      Disease Summary     Principle neurological diagnosis: MS     Date of symptom onset: Fall 2023  Date of diagnosis: 12/2023  Disease type at diagnosis: RR  Disease type currently: RR  Previous therapy: NA  Current therapy: DMF Apr 2024- present  Last MRI Brain: 12/7/24  Last MRI C-spine: 12/7/24  Last MRI T-spine: 12/7/24  CSF 2/28/24: W39, R4, normal P and G, OCB 10 unique, NMO IgG neg    History:     Previous records (physician notes, laboratory reports, and radiology reports) and imaging studies were reviewed and summarized. My recommendations will be communicated back to the patient's physician(s) by mail. Follow-up is expected to be with the patient's primary care physician.     Patient accompanied by parents.    In Fall of 2023, she started developed tongue abnormal sensation that progressed to R side of her face, vertigo, and Lhermitte's phenomenon. She also had leg tingling sensation. This resolved spontaneously after a few months. Denies weakness.     Currently she has leg tingling after long walks. She is now more sensitive to motion and intermittent vertigo.   Recently she's noticed that it's hard to fully empty her bladder, denies bowel dysfunction. Denies UTI.   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 She did struggle with school at the beginning of the year due to fatigue. She is on Provigil 100mg Qd but has not found it helpful yet.       She was started on DMF in Apr 2024. She developed flushing initially. She also developed rash in her face, stomach, hands. This was 2 weeks after a viral URI. Denies GI issues.     She was also prescribed Topamax for her headache but didn't continue after a dose due to drowsiness.     Denies vision abnormalities. Dysphagia, dysarthria.     Denies having mono.     ROS:     A complete 9 system ROS was reviewed by me and otherwise negative .     Past Medical History:   Diagnosis Date    Asthma     Bipolar  disorder, unspecified        Past Surgical History:   Procedure Laterality Date    ADENOIDECTOMY      TONSILLECTOMY         Family History   Problem Relation Name Age of Onset    Hypertension Father [place]     Lupus Maternal Aunt      Multiple sclerosis Neg Hx         Social History     Socioeconomic History    Marital status: Single   Tobacco Use    Smoking status: Never     Passive exposure: Never    Smokeless tobacco: Never   Substance and Sexual Activity    Alcohol use: No    Drug use: Never    Sexual activity: Yes     Partners: Male     Birth control/protection: Condom, Patch     Comment: Birth control pills     Social Determinants of Health     Financial Resource Strain: Low Risk  (12/29/2023)    Overall Financial Resource Strain (CARDIA)     Difficulty of Paying Living Expenses: Not very hard   Food Insecurity: No Food Insecurity (12/29/2023)    Hunger Vital Sign     Worried About Running Out of Food in the Last Year: Never true     Ran Out of Food in the Last Year: Never true   Transportation Needs: No Transportation Needs (12/29/2023)    PRAPARE - Transportation     Lack of Transportation (Medical): No     Lack of Transportation (Non-Medical): No   Physical Activity: Inactive (12/29/2023)    Exercise Vital Sign     Days of Exercise per Week: 0 days     Minutes of Exercise per Session: 0 min   Stress: No Stress Concern Present (12/29/2023)    [employer] - Occupational Stress Questionnaire     Feeling of Stress : Only a little   Housing Stability: Low Risk  (12/29/2023)    Housing Stability Vital Sign     Unable to Pay for Housing in the Last Year: No     Number of Places Lived in the Last Year: 2     Unstable Housing in the Last Year: No       Review of patient's allergies indicates:  No Known Allergies    Living arrangements - the patient lives with their family.    Patient Employment       Status   Student - Full Time               Exam:     Vitals:    08/30/24 0826   BP: 133/86  "  Pulse: 105   Weight: 53.2 kg (117 lb 4.6 oz)   Height: 5' 5" (1.651 m)          In general, the patient is well nourished and appears to be in no acute distress.    Fundi are normal bilaterally.    MENTAL STATUS: language is fluent, normal verbal comprehension, short-term and remote memory is intact, attention is normal, patient is alert and oriented x 3, fund of knowlege is appropriate by vocabulary. Behavior and judgment appropriate with full medical decision making capacity.     CRANIAL NERVE EXAM:  There is no intrernuclear ophthalmoplegia.  Extraocular muscles are intact. Pupils are equal, round, and reactive to light. VFs intact. No facial asymmetry. Facial sensation is intact bilaterally. There is no dysarthria. Uvula is midline, and palate moves symmetrically. Shoulder shrug intact bilaterlly. Tongue protrusion is midline. Hearing is intact to finger rub bilaterally. Neck is supple with full ROM  No Lhermitte's    MOTOR EXAM: Normal bulk and tone throughout UE and LE bilaterally.   No pronator drift; rapid sequential movements are normal;       Strength:  R deltoid 5/5, L deltoid 5/5  R biceps 5/5, L biceps 5/5  R triceps 5/5, L triceps 5/5  R finger flexors 5/5, L finger flexors 5/5  R finger extensors 5/5, L finger extensors 5/5  R finger abductors 5/5, L finger abductors 5/5  R  hip flexors 5/5, L hip flexors 5/5  R  hip extensors 5/5, L hip extensors 5/5  R knee extensors 5/5, L knee extensors 5/5  R knee flexors 5/5, L knee flexors 5/5  R ankle dorsiflexors 5/5, L ankle dorsiflexors 5/5  R ankle plantarflexors 5/5, L ankle plantarflexors 5/5    REFLEXES: 2+ BUE, 3+ b/l patella with cross adductors b/l, toes upgoing on R, equivocal on L, no smith's    SENSORY EXAM: Normal to light touch, vibration, temperature throughout.    COORDINATION: Normal finger-to-nose exam. Normal heel-to-shin exam.    GAIT: Narrow based and stable. Able to heel walk, toe walk, and perform tandem gait.     Negative " "Romberg's        8/30/2024    12:02 AM   Timed 25 Foot Walk:   Did patient wear an AFO? No   Was assistive device used? No   Time for 25 Foot Walk (seconds) 4.01   Time for 25 Foot Walk (seconds) 4.12             Imaging (personally reviewed):     No PV lesions, but has KRIS lesions and a pontocerebellar lesion        Results for orders placed during the hospital encounter of 12/07/23    MRI Brain Demyelinating W W/O Contrast    Impression  Findings in the cerebral white matter and right cerebellum which are consistent with reported history of multiple sclerosis.  No enhancing lesions to suggest active disease.      Electronically signed by: MICHELLE Coburn MD  Date:    12/07/2023  Time:    14:36    Results for orders placed during the hospital encounter of 12/07/23    MRI Cervical Spine Demyelinating W W/O Contrast    Impression  Intramedullary signal changes in the cervical cord consistent with reported history of multiple sclerosis.  Equivocal solitary focus of low-grade enhancement at the C6-7 level.      Electronically signed by: MICHELLE Coburn MD  Date:    12/07/2023  Time:    14:51        Results for orders placed during the hospital encounter of 12/07/23    MRI Thoracic Spine Demyelinating W W/O Contrast    Impression  Intramedullary signal changes in the lower thoracic cord consistent with reported history of multiple sclerosis.  No enhancing lesions to suggest active disease.      Electronically signed by: MICHELLE Coburn MD  Date:    12/07/2023  Time:    14:56        Labs:     No results found for: "XLHOMFDY19ZI"  No results found for: "JCVINDEX", "JCVANTIBODY"  No results found for: "FU2DVNNE", "ABSOLUTECD3", "YJ1SGZNK", "ABSOLUTECD8", "TK3XKALW", "ABSOLUTECD4", "LABCD48"  Lab Results   Component Value Date    WBC 5.25 03/01/2024    RBC 4.28 03/01/2024    HGB 12.1 03/01/2024    HCT 37.1 03/01/2024    MCV 87 03/01/2024    MCH 28.3 03/01/2024    MCHC 32.6 03/01/2024    RDW 13.0 03/01/2024     " 03/01/2024    MPV 10.9 03/01/2024    GRAN 2.7 03/01/2024    GRAN 50.8 03/01/2024    LYMPH 2.0 03/01/2024    LYMPH 37.3 03/01/2024    MONO 0.6 03/01/2024    MONO 10.5 03/01/2024    EOS 0.0 03/01/2024    BASO 0.02 03/01/2024    EOSINOPHIL 0.8 03/01/2024    BASOPHIL 0.4 03/01/2024     Sodium   Date Value Ref Range Status   03/01/2024 139 136 - 145 mmol/L Final     Potassium   Date Value Ref Range Status   03/01/2024 3.9 3.5 - 5.1 mmol/L Final     Chloride   Date Value Ref Range Status   03/01/2024 108 95 - 110 mmol/L Final     CO2   Date Value Ref Range Status   03/01/2024 21 (L) 23 - 29 mmol/L Final     Glucose   Date Value Ref Range Status   03/01/2024 105 70 - 110 mg/dL Final     BUN   Date Value Ref Range Status   03/01/2024 9 6 - 20 mg/dL Final     Creatinine   Date Value Ref Range Status   03/01/2024 0.7 0.5 - 1.4 mg/dL Final     Calcium   Date Value Ref Range Status   03/01/2024 9.6 8.7 - 10.5 mg/dL Final     Total Protein   Date Value Ref Range Status   03/01/2024 8.2 6.0 - 8.4 g/dL Final     Albumin   Date Value Ref Range Status   03/01/2024 4.0 3.5 - 5.2 g/dL Final     Total Bilirubin   Date Value Ref Range Status   03/01/2024 0.3 0.1 - 1.0 mg/dL Final     Comment:     For infants and newborns, interpretation of results should be based  on gestational age, weight and in agreement with clinical  observations.    Premature Infant recommended reference ranges:  Up to 24 hours.............<8.0 mg/dL  Up to 48 hours............<12.0 mg/dL  3-5 days..................<15.0 mg/dL  6-29 days.................<15.0 mg/dL       Alkaline Phosphatase   Date Value Ref Range Status   03/01/2024 51 (L) 55 - 135 U/L Final     AST   Date Value Ref Range Status   03/01/2024 14 10 - 40 U/L Final     ALT   Date Value Ref Range Status   03/01/2024 11 10 - 44 U/L Final     Anion Gap   Date Value Ref Range Status   03/01/2024 10 8 - 16 mmol/L Final     eGFR if    Date Value Ref Range Status   02/17/2016 SEE COMMENT  >60 mL/min/1.73 m^2 Final     eGFR if non    Date Value Ref Range Status   02/17/2016 SEE COMMENT >60 mL/min/1.73 m^2 Final     Comment:     Calculation used to obtain the estimated glomerular filtration  rate (eGFR) is the CKD-EPI equation. Since race is unknown   in our information system, the eGFR values for   -American and Non--American patients are given   for each creatinine result.  Test not performed.  GFR calculation is only valid for patients   18 and older.                  Diagnosis/Assessment/Plan:     Multiple Sclerosis  -Assessment: RRMS with brain and spinal cord lesions, improved since initial relapse with mild residual symptoms  -Imaging: MRI soon for re-baseline  -Disease Modifying Therapies: Continue DMF, safety labs reviewed and new set ordered.     Symptom management   -Discussed migraine nutraceuticals and lifestyle changes, this may also help with fatigue, ok to hold of on prophylactic meds for now given infrequent attacks    -Recommended psychology today for therapist for depression, also made referral to [facility].    -Monitor urinary retention, may need to refer to urology if this worsens   -Discussed brain health measures  Plan discussed and questions were answered to satisfaction.  Return to clinic in 1-2 months after MRI and safety labs to finalize plan re DMT.        NEURO MULTIPLE SCLEROSIS IMPRESSION:   Number of relapses in the past year?:  1  Clinical Progression:  Clinically Stable  MRI Progression:  Stable  MS Classification:  Relapsing-Remitting MS  DMT:  No change in management  Symptom Management:  Implement change in symptom management  Implement Change in Symptom Management:  Mood and Pain                 Doris Cloud MD, MSc  Attending neurologist

## 2024-09-03 LAB
ABSOLUTE CD3: 2413 CELLS/UL (ref 700–2100)
ABSOLUTE CD8: 661 CELLS/UL (ref 200–900)
CD3%: 70.4 % (ref 55–83)
CD3+CD4+ CELLS # BLD: 1666 CELLS/UL (ref 300–1400)
CD3+CD4+ CELLS NFR BLD: 48.6 % (ref 28–57)
CD4/CD8 RATIO: 2.52 (ref 0.9–3.6)
CD8 % SUPPRESSOR T CELL: 19.3 % (ref 10–39)
CNS DEMYELINATING DISEASE EVAL: NORMAL
MOG-IGG1: NEGATIVE
NMO/AQP4 FACS,S: NEGATIVE

## 2024-09-04 LAB
JCPYV AB SERPL QL IA: POSITIVE
JCV INDEX: 3.52

## 2024-09-05 LAB — NEUROFILAMENT LIGHT CHAIN, PLASMA: 5.6 PG/ML

## 2024-09-09 ENCOUNTER — OFFICE VISIT (OUTPATIENT)
Dept: OTOLARYNGOLOGY | Facility: CLINIC | Age: 19
End: 2024-09-09
Payer: COMMERCIAL

## 2024-09-09 VITALS — HEIGHT: 65 IN | BODY MASS INDEX: 19.83 KG/M2 | WEIGHT: 119.06 LBS

## 2024-09-09 DIAGNOSIS — H69.01 PATULOUS EUSTACHIAN TUBE OF RIGHT EAR: Primary | ICD-10-CM

## 2024-09-09 PROCEDURE — 1159F MED LIST DOCD IN RCRD: CPT | Mod: CPTII,S$GLB,, | Performed by: STUDENT IN AN ORGANIZED HEALTH CARE EDUCATION/TRAINING PROGRAM

## 2024-09-09 PROCEDURE — 99999 PR PBB SHADOW E&M-EST. PATIENT-LVL II: CPT | Mod: PBBFAC,,, | Performed by: STUDENT IN AN ORGANIZED HEALTH CARE EDUCATION/TRAINING PROGRAM

## 2024-09-09 PROCEDURE — 3008F BODY MASS INDEX DOCD: CPT | Mod: CPTII,S$GLB,, | Performed by: STUDENT IN AN ORGANIZED HEALTH CARE EDUCATION/TRAINING PROGRAM

## 2024-09-09 PROCEDURE — 69433 CREATE EARDRUM OPENING: CPT | Mod: RT,S$GLB,, | Performed by: STUDENT IN AN ORGANIZED HEALTH CARE EDUCATION/TRAINING PROGRAM

## 2024-09-09 PROCEDURE — 99213 OFFICE O/P EST LOW 20 MIN: CPT | Mod: 25,S$GLB,, | Performed by: STUDENT IN AN ORGANIZED HEALTH CARE EDUCATION/TRAINING PROGRAM

## 2024-09-09 RX ORDER — CIPROFLOXACIN HYDROCHLORIDE 3 MG/ML
4 SOLUTION/ DROPS OPHTHALMIC 2 TIMES DAILY
Qty: 10 ML | Refills: 0 | Status: SHIPPED | OUTPATIENT
Start: 2024-09-09 | End: 2024-09-14

## 2024-09-09 NOTE — PROGRESS NOTES
"Chief complaint:    Chief Complaint   Patient presents with    Ear Problem     Pt states the ear is the same            Referring Provider:  No referring provider defined for this encounter.      History of present illness 8/1/24:     The patient endorses right > left ear fullness. Onset of this chief complaint was about 2 years ago.  Additional symptoms that also have been associated are aural fullness, autophony, hearing her own breathing. No popping.    Ear itching as well.    Return clinic visit,  8/12/24  Overall symptoms have been about the same, but not for the last 2 days    Return clinic visit, 9/9/24  At her last visit we attempted myringotomy. This did improve her ear fullness for about 2 weeks. She did have mild worsening of hearing during that time. Now the ear pressure and fullness is back.    History      Past Medical History:   Past Medical History:   Diagnosis Date    Asthma     Bipolar disorder, unspecified          Past Surgical History:  Past Surgical History:   Procedure Laterality Date    ADENOIDECTOMY      TONSILLECTOMY           Medications: Medication list reviewed. She  has a current medication list which includes the following prescription(s): dimethyl fumarate, modafinil, and norethindrone-ethinyl estradiol.     Allergies: Review of patient's allergies indicates:  No Known Allergies      Family history: family history includes Hypertension in her father; Lupus in her maternal aunt.         Social History          Alcohol use:  reports no history of alcohol use.            Tobacco:  reports that she has never smoked. She has never been exposed to tobacco smoke. She has never used smokeless tobacco.         Physical Examination      Vitals: Height 5' 5" (1.651 m), weight 54 kg (119 lb 0.8 oz).      General: Well developed, well nourished, well hydrated.     Voice: no hoarseness, no dysarthria      Head/Face: Normocephalic, atraumatic. No scars or lesions. Facial musculature equal.     Eyes: " No scleral icterus or conjunctival hemorrhage. EOMI. PERRLA.     Ears: after disimpaction    Right ear: No gross deformity. EAC is clear of debris and erythema. TM are intact with a pneumatized middle ear. No signs of retraction, fluid or infection.      Left ear: No gross deformity. EAC is clear of debris and erythema. TM are intact with a pneumatized middle ear. No signs of retraction, fluid or infection.      Nose: No gross deformity or lesions. No purulent discharge. No significant NSD.      Mouth/Oropharynx: Lips without any lesions. No mucosal lesions within the oropharynx. No tonsillar exudate or lesions. Pharyngeal walls symmetrical. Uvula midline. Tongue midline without lesions.     Neck: Trachea midline. No masses. No thyromegaly or nodules palpated.     Lymphatic: No lymphadenopathy in the neck.     Extremities: No cyanosis. Warm and well-perfused.     Skin: No scars or lesions on face or neck.      Neurologic: Moving all extremities without gross abnormality.CN II-XII grossly intact. House-Brackmann 1/6. No signs of nystagmus.          Data reviewed      Review of records:      I reviewed records from the referring provider's office visits, describing the history, workup, and/or treatment of this problem thus far.      Normal hearing  Type A tymps  Negative patulous testing      Procedure Note - Tympanostomy Tube Placement     Diagnosis: right patulous Eustachian Tube Dysfunction     DETAILS OF PROCEDURE:   Obstructing cerumen was removed from the right ear canal using magnification and appropriate instrumentation. The middle ear was inspected under the microscope. A joint decision was made to proceed with a myringotomy with or without a tympanostomy tube.  With the TM fully in view, the tympanic membrane was anesthetized and a radial incision in the anterior/inferior quadrant.  The middle ear cavity was suctioned out.  A beveled tympanostomy tube was grasped with an alligator forcep and inserted into  the myringotomy. A straight pick was used to push the tube into place. The patient tolerated the procedure well.       Assessment/Plan:    No diagnosis found.         history most consistent with patulous Eustachian Tube Dysfunction and she did get symptomatic improvement with myringotomy, therefore PET was placed today for more long-term relief.   Ciloxan x 5 days  Tube precautions explained  F/u 1-2 months, sooner with issues          Avi Keane MD  [facility]   [facility] - [address]  [place] [address]  P: [phone]  F: [phone]

## 2024-10-01 ENCOUNTER — PATIENT MESSAGE (OUTPATIENT)
Dept: NEUROLOGY | Facility: CLINIC | Age: 19
End: 2024-10-01
Payer: COMMERCIAL

## 2024-10-01 RX ORDER — DIAZEPAM 5 MG/1
5 TABLET ORAL
Qty: 2 TABLET | Refills: 0 | Status: SHIPPED | OUTPATIENT
Start: 2024-10-01 | End: 2024-10-31

## 2024-10-02 ENCOUNTER — HOSPITAL ENCOUNTER (OUTPATIENT)
Dept: RADIOLOGY | Facility: HOSPITAL | Age: 19
Discharge: HOME | End: 2024-10-02
Attending: STUDENT IN AN ORGANIZED HEALTH CARE EDUCATION/TRAINING PROGRAM
Payer: COMMERCIAL

## 2024-10-02 DIAGNOSIS — G35 MULTIPLE SCLEROSIS, RELAPSING-REMITTING: ICD-10-CM

## 2024-10-02 PROCEDURE — 72141 MRI NECK SPINE W/O DYE: CPT | Mod: TC

## 2024-10-02 PROCEDURE — 72146 MRI CHEST SPINE W/O DYE: CPT | Mod: TC

## 2024-10-02 PROCEDURE — 70551 MRI BRAIN STEM W/O DYE: CPT | Mod: TC

## 2024-10-07 ENCOUNTER — OFFICE VISIT (OUTPATIENT)
Dept: NEUROLOGY | Facility: CLINIC | Age: 19
End: 2024-10-07
Payer: COMMERCIAL

## 2024-10-07 DIAGNOSIS — F32.A DEPRESSION, UNSPECIFIED DEPRESSION TYPE: ICD-10-CM

## 2024-10-07 DIAGNOSIS — E55.9 VITAMIN D DEFICIENCY: Primary | ICD-10-CM

## 2024-10-07 DIAGNOSIS — R53.83 FATIGUE, UNSPECIFIED TYPE: ICD-10-CM

## 2024-10-07 DIAGNOSIS — G47.429 NARCOLEPSY DUE TO UNDERLYING CONDITION WITHOUT CATAPLEXY: ICD-10-CM

## 2024-10-07 DIAGNOSIS — G35 MULTIPLE SCLEROSIS, RELAPSING-REMITTING: ICD-10-CM

## 2024-10-07 PROCEDURE — 99215 OFFICE O/P EST HI 40 MIN: CPT | Mod: 95,,,

## 2024-10-07 PROCEDURE — G2211 COMPLEX E/M VISIT ADD ON: HCPCS | Mod: 95,,,

## 2024-10-07 PROCEDURE — 1160F RVW MEDS BY RX/DR IN RCRD: CPT | Mod: CPTII,95,,

## 2024-10-07 PROCEDURE — 1159F MED LIST DOCD IN RCRD: CPT | Mod: CPTII,95,,

## 2024-10-07 RX ORDER — ERGOCALCIFEROL 1.25 MG/1
50000 CAPSULE ORAL
Qty: 12 CAPSULE | Refills: 3 | Status: SHIPPED | OUTPATIENT
Start: 2024-10-07 | End: 2025-10-07

## 2024-10-07 RX ORDER — ESCITALOPRAM OXALATE 10 MG/1
10 TABLET ORAL DAILY
Qty: 90 TABLET | Refills: 1 | Status: SHIPPED | OUTPATIENT
Start: 2024-10-07

## 2024-10-07 RX ORDER — MODAFINIL 200 MG/1
200 TABLET ORAL DAILY
Qty: 90 TABLET | Refills: 1 | Status: SHIPPED | OUTPATIENT
Start: 2024-10-07

## 2024-10-07 NOTE — PROGRESS NOTES
Patient ID: Andree Zavala is a 19 y.o. female who presents today for a routine clinic visit for MS.  She was last seen by Dr. Cloud on 8/30/2024.  The history was provided by the patient.     The patient location is: at her home in [place], [place]  The chief complaint leading to consultation is: MS     Visit type: audiovisual    Face to Face time with patient: 28 minutes     Each patient to whom he or she provides medical services by telemedicine is:  (1) informed of the relationship between the physician and patient and the respective role of any other health care provider with respect to management of the patient; and (2) notified that he or she may decline to receive medical services by telemedicine and may withdraw from such care at any time.    Principle neurological diagnosis: MS   Date of symptom onset: Fall 2023  Date of diagnosis: 12/2023  Disease type at diagnosis: RR  Disease type currently: RR  Previous therapy: NA  Current therapy: DMF Apr 2024- present  Vitamin D Dose: [id number] weekly   Last MRI Brain: 10/2/2024 - stable   Last MRI C-spine: 10/2/2024 - stable   Last MRI T-spine: 10/2/2024 - stable    CSF 2/28/24: W39, R4, normal P and G, OCB 10 unique, NMO IgG neg   JCV status: 8/30/2024 - positive 3.52 index  Other relevant labs and tests: 8/30/2024: Vitamin D - 27, NMO and MOG - negative, NfL - 5.6    Subjective:     She reports that she is feeling stable with no new symptoms.  She will have fatigue and occasional Lhermitte's sign.  She will also have buzzing in her feel with showers and longer walks.     She states that modafinil 200mg is helping but will still have some fatigue.  She is no longer needing to take naps during the day though.     She states she has been struggling with depression lately and she has had a panic attack just recently. She states that in the past she was diagnosed with bipolar by one psychiatrist, but another psychiatrist disagreed and thought it was personality  disorder.  She has been on Lexapro, Prozac, Lamictal, and Wellbutrin, all of which she was only on for a few months, then would stop on her own.  She did not have negative side effects from any of the medications except for Wellbutrin, which made her depression much worse for about a week.   She has not yet looked at [email or web address], but she is willing to do so soon.          SOCIAL HISTORY  Living arrangements - the patient lives alone.  Social History     Socioeconomic History    Marital status: Single   Tobacco Use    Smoking status: Never     Passive exposure: Never    Smokeless tobacco: Never   Substance and Sexual Activity    Alcohol use: No    Drug use: Never    Sexual activity: Yes     Partners: Male     Birth control/protection: Condom, Patch     Comment: Birth control pills     Social Drivers of Health     Financial Resource Strain: Low Risk  (12/29/2023)    Overall Financial Resource Strain (CARDIA)     Difficulty of Paying Living Expenses: Not very hard   Food Insecurity: No Food Insecurity (12/29/2023)    Hunger Vital Sign     Worried About Running Out of Food in the Last Year: Never true     Ran Out of Food in the Last Year: Never true   Transportation Needs: No Transportation Needs (12/29/2023)    PRAPARE - Transportation     Lack of Transportation (Medical): No     Lack of Transportation (Non-Medical): No   Physical Activity: Inactive (12/29/2023)    Exercise Vital Sign     Days of Exercise per Week: 0 days     Minutes of Exercise per Session: 0 min   Stress: No Stress Concern Present (12/29/2023)    [employer] - Occupational Stress Questionnaire     Feeling of Stress : Only a little   Housing Stability: Low Risk  (12/29/2023)    Housing Stability Vital Sign     Unable to Pay for Housing in the Last Year: No     Number of Places Lived in the Last Year: 2     Unstable Housing in the Last Year: No       Current Outpatient Medications on File Prior to Visit   Medication  Sig Dispense Refill    diazePAM (VALIUM) 5 MG tablet Take 1 tablet (5 mg total) by mouth as needed (Claustrophobia and anxiety in MRI). 2 tablet 0    dimethyl fumarate 240 mg CpDR Take 240 mg by mouth 2 (two) times daily. 60 capsule 5    norethindrone-ethinyl estradiol (OVCON) 0.4-35 mg-mcg per tablet Take 1 tablet by mouth once daily. 28 tablet 8     No current facility-administered medications on file prior to visit.       Objective:     1. 25 foot timed walk:      8/30/2024     9:05 AM   Timed 25 Foot Walk:   Did patient wear an AFO? No   Was assistive device used? No   Time for 25 Foot Walk (seconds) 4.01   Time for 25 Foot Walk (seconds) 4.12           NEURO EXAM    In general, the patient is well nourished and appears to be in no acute distress.    MENTAL STATUS: language is fluent, normal verbal comprehension, short-term and remote memory is intact, attention is normal, patient is alert and oriented x 3, fund of knowlege is appropriate by vocabulary.       Imaging:     Personally reviewed and discussed with patient.     Results for orders placed during the hospital encounter of 10/02/24    MRI Brain Demyelinating Without Contrast    Impression  1.    White matter changes as above, consistent with history of demyelinating disease.  2.    Evaluation for new active disease is limited due to lack of contrast.  3.    No acute intracranial abnormality.    Finalized on: 10/2/2024 12:15 PM By:  Kathrin Kelly MD  [facility]# [id number]      2024-10-02 12:17:52.963    [facility]    Results for orders placed during the hospital encounter of 10/02/24    MRI Cervical Spine Demyelinating Without Contrast    Impression  Possible increased T2 hyperintense lesions in the cervical cord as above consistent with history of demyelinating disease.    Finalized on: 10/2/2024 1:03 PM By:  Kathrin Kelly MD  [facility]# [id number]      2024-10-02 13:06:06.237    [facility]    Results for orders placed during the hospital encounter of 10/02/24    MRI Thoracic Spine  Demyelinating Without Contrast    Impression  Stable exam with redemonstration of white matter changes in the cord at T10 and T11 consistent with history of demyelinating disease.  No new white matter lesions.    Finalized on: 10/2/2024 2:02 PM By:  Kathrin Kelly MD  [facility]# [id number]      2024-10-02 14:04:41.173    [facility]    Results for orders placed during the hospital encounter of 12/07/23    MRI Brain Demyelinating W W/O Contrast    Impression  Findings in the cerebral white matter and right cerebellum which are consistent with reported history of multiple sclerosis.  No enhancing lesions to suggest active disease.      Electronically signed by: MICHELLE Coburn MD  Date:    12/07/2023  Time:    14:36    Results for orders placed during the hospital encounter of 12/07/23    MRI Cervical Spine Demyelinating W W/O Contrast    Impression  Intramedullary signal changes in the cervical cord consistent with reported history of multiple sclerosis.  Equivocal solitary focus of low-grade enhancement at the C6-7 level.      Electronically signed by: MICHELLE Coburn MD  Date:    12/07/2023  Time:    14:51    Results for orders placed during the hospital encounter of 12/07/23    MRI Thoracic Spine Demyelinating W W/O Contrast    Impression  Intramedullary signal changes in the lower thoracic cord consistent with reported history of multiple sclerosis.  No enhancing lesions to suggest active disease.      Electronically signed by: MICHELLE Coburn MD  Date:    12/07/2023  Time:    14:56        Labs:     Lab Results   Component Value Date    UZHLQUSW42PF 27 (L) 08/30/2024     Lab Results   Component Value Date    JCVINDEX 3.52 (H) 08/30/2024    JCVANTIBODY POSITIVE (A) 08/30/2024     Lab Results   Component Value Date    HO5OEEVS 70.4 08/30/2024    ABSOLUTECD3 2413 (H) 08/30/2024    QV8KDDOW 19.3 08/30/2024    ABSOLUTECD8 661 08/30/2024    TM0FNRYT 48.6 08/30/2024    ABSOLUTECD4 1666 (H) 08/30/2024    LABCD48 2.52 08/30/2024     Lab  Results   Component Value Date    WBC 9.47 08/30/2024    RBC 4.10 08/30/2024    HGB 11.4 (L) 08/30/2024    HCT 36.4 (L) 08/30/2024    MCV 89 08/30/2024    MCH 27.8 08/30/2024    MCHC 31.3 (L) 08/30/2024    RDW 14.6 (H) 08/30/2024     08/30/2024    MPV 11.1 08/30/2024    GRAN 5.3 08/30/2024    GRAN 55.7 08/30/2024    LYMPH 3.2 08/30/2024    LYMPH 33.3 08/30/2024    MONO 0.9 08/30/2024    MONO 9.5 08/30/2024    EOS 0.1 08/30/2024    BASO 0.03 08/30/2024    EOSINOPHIL 0.8 08/30/2024    BASOPHIL 0.3 08/30/2024     Sodium   Date Value Ref Range Status   08/30/2024 138 136 - 145 mmol/L Final     Potassium   Date Value Ref Range Status   08/30/2024 3.9 3.5 - 5.1 mmol/L Final     Chloride   Date Value Ref Range Status   08/30/2024 105 95 - 110 mmol/L Final     CO2   Date Value Ref Range Status   08/30/2024 26 23 - 29 mmol/L Final     Glucose   Date Value Ref Range Status   08/30/2024 113 (H) 70 - 110 mg/dL Final     BUN   Date Value Ref Range Status   08/30/2024 12 6 - 20 mg/dL Final     Creatinine   Date Value Ref Range Status   08/30/2024 0.7 0.5 - 1.4 mg/dL Final     Calcium   Date Value Ref Range Status   08/30/2024 10.0 8.7 - 10.5 mg/dL Final     Total Protein   Date Value Ref Range Status   08/30/2024 8.2 6.0 - 8.4 g/dL Final     Albumin   Date Value Ref Range Status   08/30/2024 4.0 3.5 - 5.2 g/dL Final     Total Bilirubin   Date Value Ref Range Status   08/30/2024 0.3 0.1 - 1.0 mg/dL Final     Comment:     For infants and newborns, interpretation of results should be based  on gestational age, weight and in agreement with clinical  observations.    Premature Infant recommended reference ranges:  Up to 24 hours.............<8.0 mg/dL  Up to 48 hours............<12.0 mg/dL  3-5 days..................<15.0 mg/dL  6-29 days.................<15.0 mg/dL       Alkaline Phosphatase   Date Value Ref Range Status   08/30/2024 50 (L) 55 - 135 U/L Final     AST   Date Value Ref Range Status   08/30/2024 11 10 - 40 U/L  Final     ALT   Date Value Ref Range Status   08/30/2024 8 (L) 10 - 44 U/L Final     Anion Gap   Date Value Ref Range Status   08/30/2024 7 (L) 8 - 16 mmol/L Final     eGFR if    Date Value Ref Range Status   02/17/2016 SEE COMMENT >60 mL/min/1.73 m^2 Final     eGFR if non    Date Value Ref Range Status   02/17/2016 SEE COMMENT >60 mL/min/1.73 m^2 Final     Comment:     Calculation used to obtain the estimated glomerular filtration  rate (eGFR) is the CKD-EPI equation. Since race is unknown   in our information system, the eGFR values for   -American and Non--American patients are given   for each creatinine result.  Test not performed.  GFR calculation is only valid for patients   18 and older.       Lab Results   Component Value Date    HEPBSAB 98774.0 09/09/2024           MS Impression and Plan:     NEURO MULTIPLE SCLEROSIS IMPRESSION:   Number of relapses in the past year?:  0  Clinical Progression:  Clinically Stable  MRI Progression:  Stable  MS Classification:  Relapsing-Remitting MS  DMT:  No change in management  DMT comment:  Continue DMF.   Symptom Management:  Implement change in symptom management  Implement Change in Symptom Management:  Mood (will start Lexapro and refer to psychiatry for med management for anxiety/depression/diagnosis)     Safety labs in February  MRIs in 6 months  Virtual follow up in 2 months to assess Lexapro  Plan discussed and questions were answered to satisfaction.     Problem List Items Addressed This Visit          Neuro    Multiple sclerosis, relapsing-remitting    Relevant Medications    modafiniL (PROVIGIL) 200 MG Tab    ergocalciferol (ERGOCALCIFEROL) 50,000 unit Cap    EScitalopram oxalate (LEXAPRO) 10 MG tablet    Other Relevant Orders    Ambulatory referral/consult to Psychiatry    CBC Auto Differential    Comprehensive Metabolic Panel    [place]-Suppressor Ratio    MRI Brain Demyelinating W W/O Contrast    MRI Cervical  Spine Demyelinating W W/O Contrast    MRI Thoracic Spine Demyelinating W W/O Contrast     Other Visit Diagnoses       Vitamin D deficiency    -  Primary    Narcolepsy due to underlying condition without cataplexy        Relevant Medications    modafiniL (PROVIGIL) 200 MG Tab    Fatigue, unspecified type        Relevant Medications    modafiniL (PROVIGIL) 200 MG Tab    Depression, unspecified depression type        Relevant Medications    EScitalopram oxalate (LEXAPRO) 10 MG tablet    Other Relevant Orders    Ambulatory referral/consult to Psychiatry            I spent a total of 50 minutes on the day of the visit.This includes face to face time and non-face to face time preparing to see the patient (eg, review of tests), obtaining and/or reviewing separately obtained history, documenting clinical information in the electronic or other health record, independently interpreting results and communicating results to the patient/family/caregiver, or care coordinator.       Lupe Steele, JOHANA-C

## 2024-10-07 NOTE — Clinical Note
She will need labs in February, MRIs in 6 months and a VV with me in 2 months, can you please schedule these for her.  She may want MRIs in [place], so can you give her the number if so. Thank you!!

## 2024-11-07 ENCOUNTER — TELEPHONE (OUTPATIENT)
Dept: NEUROLOGY | Facility: CLINIC | Age: 19
End: 2024-11-07
Payer: COMMERCIAL

## 2024-11-07 NOTE — TELEPHONE ENCOUNTER
Spoke with pt to schedule her MRI's, labs, and follow up. Pt is scheduled for labs in February. Follow up us scheduled next month on 12/11. Mri's will be scheduled at the [facility] ; pt will call.

## 2024-12-12 ENCOUNTER — OFFICE VISIT (OUTPATIENT)
Dept: URGENT CARE | Facility: CLINIC | Age: 19
End: 2024-12-12
Payer: COMMERCIAL

## 2024-12-12 VITALS
DIASTOLIC BLOOD PRESSURE: 78 MMHG | BODY MASS INDEX: 19.93 KG/M2 | HEIGHT: 65 IN | SYSTOLIC BLOOD PRESSURE: 131 MMHG | OXYGEN SATURATION: 99 % | TEMPERATURE: 99 F | WEIGHT: 119.63 LBS | HEART RATE: 122 BPM | RESPIRATION RATE: 16 BRPM

## 2024-12-12 DIAGNOSIS — R05.9 COUGH, UNSPECIFIED TYPE: ICD-10-CM

## 2024-12-12 DIAGNOSIS — J40 BRONCHITIS: Primary | ICD-10-CM

## 2024-12-12 PROCEDURE — 99213 OFFICE O/P EST LOW 20 MIN: CPT | Mod: S$GLB,,,

## 2024-12-12 RX ORDER — ALBUTEROL SULFATE 90 UG/1
2 INHALANT RESPIRATORY (INHALATION) EVERY 6 HOURS PRN
Qty: 8 G | Refills: 0 | Status: SHIPPED | OUTPATIENT
Start: 2024-12-12 | End: 2025-12-12

## 2024-12-12 RX ORDER — PROMETHAZINE HYDROCHLORIDE AND DEXTROMETHORPHAN HYDROBROMIDE 6.25; 15 MG/5ML; MG/5ML
5 SYRUP ORAL EVERY 4 HOURS PRN
Qty: 118 ML | Refills: 0 | Status: SHIPPED | OUTPATIENT
Start: 2024-12-12

## 2024-12-12 RX ORDER — METHYLPREDNISOLONE 4 MG/1
TABLET ORAL
Qty: 21 EACH | Refills: 0 | Status: SHIPPED | OUTPATIENT
Start: 2024-12-12 | End: 2025-01-02

## 2024-12-12 NOTE — PATIENT INSTRUCTIONS
"Bronchitis  If your condition worsens or fails to improve we recommend that you receive another evaluation at the ER immediately or contact your PCP to discuss your concerns or return here. You must understand that you've received an urgent care treatment only and that you may be released before all your medical problems are known or treated. You the patient will arrange for follow-up care as instructed.  Rest and fluids are important.  Can use honey with geoffrey to soothe your throat.  Take inhaler as prescribed and needed for wheezing.  Take prescription cough meds (pills) as prescribed; take prescription cough syrup at night as needed for cough. Do not take both the prescribed cough pills and syrup at the same time.  -  Flonase (fluticasone) is a nasal spray which is available over the counter and may help with your symptoms.   -  Zyrtec D, Claritin D or Allegra D can also help with symptoms of congestion and drainage.   -  If you have hypertension avoid using the "D" which is the decongestant.  Instead you can use Coricidin HBP for cold and cough symptoms.    -  If you just have drainage you can take plain Zyrtec, Claritin or Allegra.  -  If you just have a congested feeling you can take pseudoephedrine (unless you have high blood pressure) which you have to sign for behind the counter. Do not buy the phenylephrine which is on the shelf as it is not effective.  -  Rest and fluids are also important.   -  Tylenol or ibuprofen can also be used as directed for pain unless you have an allergy to them or medical condition such as stomach ulcers, kidney or liver disease or blood thinners etc for which you should not be taking these type of medications.   Please follow-up with your primary care doctor or specialist in the next 48-72 hrs as needed and if no improvement.  If you smoke, please stop smoking.    ORAL STEROIDS   A short course of prednisone or methylprednisolone will almost certainly make you feel better. " Steroids boast your energy level, alleviate pain and nausea, block allergies, reduce swelling, shrink nasal polyps, alleviate asthma, and can even restore hearing in some patients with sudden deafness. However, steroids must be used with caution, because they can have significant addictive potential and cause serious side effects - especially with long-term use. For this reason, oral or systemic steroids are reserved for the most urgent uses, and TOPICAL or LOCAL steroids are preferred.     RISKS OF SYSTEMIC STEROIDS     Steroids are the most effective anti-inflammatory drugs available, and are derivatives of natural hormones which the body creates to help the body cope with injury or stress.  However, prolonged use of oral or systemic steroids can result in suppression of normal steroid levels in the body.  Therefore, these medications should be taken exactly as prescribed, usually in a gradually decreasing dose, to avoid sudden withdrawal.  Withdrawal symptoms are uncommon in patients who have used steroids for less than two weeks at a time.  Continued or repeated use of steroids can reduce your ability to fight infection and can result in weight gain, fluid retention, acne, increased body hair, purple marks on the abdomen, collection of fatty deposits under the skin, and easy bruising.  High doses of steroids will frequently cause nervousness, sleeplessness, excitation, and sometimes depression or confusion.  Steroids can also cause elevation of blood sugar or blood pressure or change in salt balance.  Prolonged steroids can cause thinning of the bones, muscle weakness, glaucoma, and cataracts.  They can aggravate ulcers.  Patients who are pregnant, have a history of stomach ulcers, glaucoma, diabetes, high blood pressure, tuberculosis, osteoporosis, or recent vaccination, should not take steroids unless absolutely necessary.  A very rare complication of steroids is interruption of the blood supply to the hip  bone which can result in a fracture that requires a hip replacement.   Fortunately, all of these complications are extremely rare in patients treated with short-term doses of steroids.  If your doctor has prescribed systemic steroids, he or she has likely judged that the risk of these complications is outweighed by the potential benefit for the treatment of your disease.  If you have any questions about this information or the instructions on how to take your steroids, please speak with your doctor before you begin the medication.   Alternatives to systemic steroids include topical applications to the nose, skin, lung or ear, so that the systemic dose - that which distributes through the body - is greatly reduced. Topical steroids greatly reduce the risk of prolonged use of steroids.

## 2024-12-12 NOTE — PROGRESS NOTES
"Subjective:      Patient ID: Andree Zavala is a 19 y.o. female.    Vitals:  height is 5' 5" (1.651 m) and weight is 54.2 kg (119 lb 9.6 oz). Her oral temperature is 98.6 °F (37 °C). Her blood pressure is 131/78 and her pulse is 122 (abnormal). Her respiration is 16 and oxygen saturation is 99%.     Chief Complaint: Cough    Andree aZvala is a 19 y.o. female who presents for productive cough which onset 1 week ago. Associated sxs include sore throat and PND. Patient denies any fever, chills, SOB, CP, abd pain, n/v/d, rash, dizziness, or numbness/tingling. Prior Tx includes OTC meds. Notes sick contacts.     Cough  This is a new problem. The current episode started 1 to 4 weeks ago (Onset about a week ago). The problem has been unchanged. The problem occurs every few minutes. The cough is Productive of sputum. Associated symptoms include postnasal drip, rhinorrhea and a sore throat (scratchy throat). Pertinent negatives include no chest pain, chills, ear congestion, ear pain (right ear popping), fever, headaches, hemoptysis, myalgias, nasal congestion, shortness of breath or wheezing. The symptoms are aggravated by lying down. Treatments tried: Dayquil, Nyquil, Mucinex, OTC cough, chloraseptic. The treatment provided mild relief. Her past medical history is significant for asthma (childhood asthma). There is no history of bronchitis, environmental allergies or pneumonia.       Constitution: Negative for appetite change, chills, sweating, fatigue and fever.   HENT:  Positive for postnasal drip and sore throat (scratchy throat). Negative for ear pain (right ear popping), ear discharge, hearing loss, congestion, sinus pain, sinus pressure and trouble swallowing.    Cardiovascular:  Negative for chest pain.   Respiratory:  Positive for cough. Negative for sputum production, bloody sputum, shortness of breath and wheezing.    Gastrointestinal:  Negative for abdominal pain, nausea, vomiting and diarrhea. "   Musculoskeletal:  Negative for muscle ache.   Allergic/Immunologic: Negative for environmental allergies.   Neurological:  Negative for dizziness, headaches, numbness and tingling.      Objective:     Physical Exam   Constitutional: She is oriented to person, place, and time. She appears well-developed. She is cooperative.  Non-toxic appearance. She does not appear ill. No distress.   HENT:   Head: Normocephalic and atraumatic.   Ears:   Right Ear: Hearing, tympanic membrane, external ear and ear canal normal.   Left Ear: Hearing, tympanic membrane, external ear and ear canal normal.   Nose: Nose normal. No mucosal edema or nasal deformity. No epistaxis. Right sinus exhibits no maxillary sinus tenderness and no frontal sinus tenderness. Left sinus exhibits no maxillary sinus tenderness and no frontal sinus tenderness.   Mouth/Throat: Uvula is midline, oropharynx is clear and moist and mucous membranes are normal. Mucous membranes are moist. No trismus in the jaw. Normal dentition. No uvula swelling. No oropharyngeal exudate, posterior oropharyngeal edema or posterior oropharyngeal erythema.   Eyes: Conjunctivae and lids are normal. No scleral icterus. Extraocular movement intact   Neck: Trachea normal and phonation normal. Neck supple. No edema present. No erythema present. No neck rigidity present.   Cardiovascular: Normal rate, regular rhythm, normal heart sounds and normal pulses.   Pulmonary/Chest: Effort normal and breath sounds normal. No stridor. No respiratory distress. She has no decreased breath sounds. She has no wheezes. She has no rhonchi. She has no rales.   Abdominal: Normal appearance.   Musculoskeletal: Normal range of motion.         General: No deformity. Normal range of motion.   Neurological: She is alert and oriented to person, place, and time. She exhibits normal muscle tone. Coordination normal.   Skin: Skin is warm, dry, intact, not diaphoretic and not pale.   Psychiatric: Her speech is  normal and behavior is normal. Judgment and thought content normal.   Nursing note and vitals reviewed.      Assessment:     1. Bronchitis    2. Cough, unspecified type        Plan:       Bronchitis  -     methylPREDNISolone (MEDROL DOSEPACK) 4 mg tablet; use as directed  Dispense: 21 each; Refill: 0  -     albuterol (VENTOLIN HFA) 90 mcg/actuation inhaler; Inhale 2 puffs into the lungs every 6 (six) hours as needed for Wheezing. Rescue  Dispense: 8 g; Refill: 0    Cough, unspecified type  -     promethazine-dextromethorphan (PROMETHAZINE-DM) 6.25-15 mg/5 mL Syrp; Take 5 mLs by mouth every 4 (four) hours as needed (for cough).  Dispense: 118 mL; Refill: 0      Afebrile. VSS. Patient is in NAD.  Lungs clear to ausculation. No indications for imaging at this time.  Discussed negative results with patient.  Educated patient on viral vs bacterial bronchitis.   Meds: medrol dose pack, promethazine DM, and albuterol sent to preferred pharmacy. Discussed side effects of steroid use. Patient verbalized understanding.  Advised patient to begin OTC decongestant and oral antihistamine for symptom relief.    Increase fluid intake and plenty of rest.  Tylenol/Ibuprofen (as permitted) as needed for any pain or discomfort.  If symptoms do not resolve, return to clinic for further evaluation.  ER precautions given such as SOB, CP, or fever not resolved with fever-reducing medications.

## 2024-12-16 DIAGNOSIS — G47.429 NARCOLEPSY DUE TO UNDERLYING CONDITION WITHOUT CATAPLEXY: ICD-10-CM

## 2024-12-16 DIAGNOSIS — G35 MULTIPLE SCLEROSIS, RELAPSING-REMITTING: ICD-10-CM

## 2024-12-16 DIAGNOSIS — R53.83 FATIGUE, UNSPECIFIED TYPE: ICD-10-CM

## 2024-12-20 RX ORDER — MODAFINIL 200 MG/1
200 TABLET ORAL DAILY
Qty: 90 TABLET | Refills: 1 | Status: SHIPPED | OUTPATIENT
Start: 2024-12-20

## 2024-12-31 ENCOUNTER — OFFICE VISIT (OUTPATIENT)
Dept: NEUROLOGY | Facility: CLINIC | Age: 19
End: 2024-12-31
Payer: COMMERCIAL

## 2024-12-31 DIAGNOSIS — R53.83 FATIGUE, UNSPECIFIED TYPE: ICD-10-CM

## 2024-12-31 DIAGNOSIS — G35 MULTIPLE SCLEROSIS, RELAPSING-REMITTING: ICD-10-CM

## 2024-12-31 DIAGNOSIS — F32.A DEPRESSION, UNSPECIFIED DEPRESSION TYPE: ICD-10-CM

## 2024-12-31 DIAGNOSIS — G47.429 NARCOLEPSY DUE TO UNDERLYING CONDITION WITHOUT CATAPLEXY: ICD-10-CM

## 2024-12-31 PROCEDURE — 99214 OFFICE O/P EST MOD 30 MIN: CPT | Mod: 95,,,

## 2024-12-31 PROCEDURE — G2211 COMPLEX E/M VISIT ADD ON: HCPCS | Mod: 95,,,

## 2024-12-31 PROCEDURE — 1160F RVW MEDS BY RX/DR IN RCRD: CPT | Mod: CPTII,95,,

## 2024-12-31 PROCEDURE — 1159F MED LIST DOCD IN RCRD: CPT | Mod: CPTII,95,,

## 2024-12-31 RX ORDER — ESCITALOPRAM OXALATE 20 MG/1
20 TABLET ORAL DAILY
Qty: 90 TABLET | Refills: 1 | Status: SHIPPED | OUTPATIENT
Start: 2024-12-31

## 2024-12-31 NOTE — PROGRESS NOTES
Patient ID: Andree Zavala is a 19 y.o. female who presents today for a routine clinic visit for MS.  She was last seen on 10/7/2024.  The history was provided by the patient.     The patient location is: at her home in [place], [place]  The chief complaint leading to consultation is: MS    Visit type: audiovisual    Face to Face time with patient: 20 minutes     Each patient to whom he or she provides medical services by telemedicine is:  (1) informed of the relationship between the physician and patient and the respective role of any other health care provider with respect to management of the patient; and (2) notified that he or she may decline to receive medical services by telemedicine and may withdraw from such care at any time.    Principle neurological diagnosis: MS   Date of symptom onset: Fall 2023  Date of diagnosis: 12/2023  Disease type at diagnosis: RR  Disease type currently: RR  Previous therapy: NA  Current therapy: DMF Apr 2024- present  Vitamin D Dose: [id number] weekly   Last MRI Brain: 10/2/2024 - stable   Last MRI C-spine: 10/2/2024 - stable   Last MRI T-spine: 10/2/2024 - stable    CSF 2/28/24: W39, R4, normal P and G, OCB 10 unique, NMO IgG neg   JCV status: 8/30/2024 - positive 3.52 index  Other relevant labs and tests: 8/30/2024: Vitamin D - 27, NMO and MOG - negative, NfL - 5.6     Subjective:     She has notice that Lexapro has helped some. She is no longer feeling like she is having anxiety attacks since starting. She states that the changes haven't been huge, but very subtle and would be interested in increasing the dose.     She states that her fatigue has been pretty significant lately.  She is missing class because of it.  She has not yet had a sleep study, but she does have a previous diagnosis of narcolepsy.     She has also noticed that she is unable to differentiate hot and cold sensations on her right anterior thigh.  She says it is not positional, but it is intermittent.        SOCIAL HISTORY  Living arrangements - the patient lives with their family.  Social History     Socioeconomic History    Marital status: Single   Tobacco Use    Smoking status: Never     Passive exposure: Never    Smokeless tobacco: Never   Substance and Sexual Activity    Alcohol use: No    Drug use: Never    Sexual activity: Yes     Partners: Male     Birth control/protection: Condom, Patch     Comment: Birth control pills     Social Drivers of Health     Financial Resource Strain: Low Risk  (12/29/2023)    Overall Financial Resource Strain (CARDIA)     Difficulty of Paying Living Expenses: Not very hard   Food Insecurity: No Food Insecurity (12/29/2023)    Hunger Vital Sign     Worried About Running Out of Food in the Last Year: Never true     Ran Out of Food in the Last Year: Never true   Transportation Needs: No Transportation Needs (12/29/2023)    PRAPARE - Transportation     Lack of Transportation (Medical): No     Lack of Transportation (Non-Medical): No   Physical Activity: Inactive (12/29/2023)    Exercise Vital Sign     Days of Exercise per Week: 0 days     Minutes of Exercise per Session: 0 min   Stress: No Stress Concern Present (12/29/2023)    [employer] - Occupational Stress Questionnaire     Feeling of Stress : Only a little   Housing Stability: Low Risk  (12/29/2023)    Housing Stability Vital Sign     Unable to Pay for Housing in the Last Year: No     Number of Places Lived in the Last Year: 2     Unstable Housing in the Last Year: No       Current Outpatient Medications on File Prior to Visit   Medication Sig Dispense Refill    albuterol (VENTOLIN HFA) 90 mcg/actuation inhaler Inhale 2 puffs into the lungs every 6 (six) hours as needed for Wheezing. Rescue 8 g 0    diazePAM (VALIUM) 5 MG tablet Take 1 tablet (5 mg total) by mouth as needed (Claustrophobia and anxiety in MRI). 2 tablet 0    ergocalciferol (ERGOCALCIFEROL) 50,000 unit Cap Take 1 capsule (50,000  Units total) by mouth every 7 days. Take 1 capsule weekly 12 capsule 3    norethindrone-ethinyl estradiol (OVCON) 0.4-35 mg-mcg per tablet Take 1 tablet by mouth once daily. 28 tablet 8    promethazine-dextromethorphan (PROMETHAZINE-DM) 6.25-15 mg/5 mL Syrp Take 5 mLs by mouth every 4 (four) hours as needed (for cough). 118 mL 0     No current facility-administered medications on file prior to visit.       Objective:     1. 25 foot timed walk:      8/30/2024     8:00 AM   Timed 25 Foot Walk:   Did patient wear an AFO? No   Was assistive device used? No   Time for 25 Foot Walk (seconds) 4.01   Time for 25 Foot Walk (seconds) 4.12           NEURO EXAM    In general, the patient is well nourished and appears to be in no acute distress.    MENTAL STATUS: language is fluent, normal verbal comprehension, short-term and remote memory is intact, attention is normal, patient is alert and oriented x 3, fund of knowlege is appropriate by vocabulary.       Imaging:     Personally reviewed.     Results for orders placed during the hospital encounter of 10/02/24    MRI Brain Demyelinating Without Contrast    Impression  1.    White matter changes as above, consistent with history of demyelinating disease.  2.    Evaluation for new active disease is limited due to lack of contrast.  3.    No acute intracranial abnormality.    Finalized on: 10/2/2024 12:15 PM By:  Kathrin Kelly MD  [facility]# [id number]      2024-10-02 12:17:52.963    [facility]    Results for orders placed during the hospital encounter of 10/02/24    MRI Cervical Spine Demyelinating Without Contrast    Impression  Possible increased T2 hyperintense lesions in the cervical cord as above consistent with history of demyelinating disease.    Finalized on: 10/2/2024 1:03 PM By:  Kathrin Kelly MD  [facility]# [id number]      2024-10-02 13:06:06.237    [facility]    Results for orders placed during the hospital encounter of 10/02/24    MRI Thoracic Spine Demyelinating Without Contrast    Impression  Stable exam  with redemonstration of white matter changes in the cord at T10 and T11 consistent with history of demyelinating disease.  No new white matter lesions.    Finalized on: 10/2/2024 2:02 PM By:  Kathrin Kelly MD  [facility]# [id number]      2024-10-02 14:04:41.173    [facility]    Results for orders placed during the hospital encounter of 12/07/23    MRI Brain Demyelinating W W/O Contrast    Impression  Findings in the cerebral white matter and right cerebellum which are consistent with reported history of multiple sclerosis.  No enhancing lesions to suggest active disease.      Electronically signed by: MICHELLE Coburn MD  Date:    12/07/2023  Time:    14:36    Results for orders placed during the hospital encounter of 12/07/23    MRI Cervical Spine Demyelinating W W/O Contrast    Impression  Intramedullary signal changes in the cervical cord consistent with reported history of multiple sclerosis.  Equivocal solitary focus of low-grade enhancement at the C6-7 level.      Electronically signed by: MICHELLE Coburn MD  Date:    12/07/2023  Time:    14:51    Results for orders placed during the hospital encounter of 12/07/23    MRI Thoracic Spine Demyelinating W W/O Contrast    Impression  Intramedullary signal changes in the lower thoracic cord consistent with reported history of multiple sclerosis.  No enhancing lesions to suggest active disease.      Electronically signed by: MICHELLE Coburn MD  Date:    12/07/2023  Time:    14:56        Labs:     Lab Results   Component Value Date    XFKLTRNQ64CR 27 (L) 08/30/2024     Lab Results   Component Value Date    JCVINDEX 3.52 (H) 08/30/2024    JCVANTIBODY POSITIVE (A) 08/30/2024     Lab Results   Component Value Date    XL8JKDLY 70.4 08/30/2024    ABSOLUTECD3 2413 (H) 08/30/2024    ZO4YZVJV 19.3 08/30/2024    ABSOLUTECD8 661 08/30/2024    OQ6NPRVR 48.6 08/30/2024    ABSOLUTECD4 1666 (H) 08/30/2024    LABCD48 2.52 08/30/2024     Lab Results   Component Value Date    WBC 9.47 08/30/2024     RBC 4.10 08/30/2024    HGB 11.4 (L) 08/30/2024    HCT 36.4 (L) 08/30/2024    MCV 89 08/30/2024    MCH 27.8 08/30/2024    MCHC 31.3 (L) 08/30/2024    RDW 14.6 (H) 08/30/2024     08/30/2024    MPV 11.1 08/30/2024    GRAN 5.3 08/30/2024    GRAN 55.7 08/30/2024    LYMPH 3.2 08/30/2024    LYMPH 33.3 08/30/2024    MONO 0.9 08/30/2024    MONO 9.5 08/30/2024    EOS 0.1 08/30/2024    BASO 0.03 08/30/2024    EOSINOPHIL 0.8 08/30/2024    BASOPHIL 0.3 08/30/2024     Sodium   Date Value Ref Range Status   08/30/2024 138 136 - 145 mmol/L Final     Potassium   Date Value Ref Range Status   08/30/2024 3.9 3.5 - 5.1 mmol/L Final     Chloride   Date Value Ref Range Status   08/30/2024 105 95 - 110 mmol/L Final     CO2   Date Value Ref Range Status   08/30/2024 26 23 - 29 mmol/L Final     Glucose   Date Value Ref Range Status   08/30/2024 113 (H) 70 - 110 mg/dL Final     BUN   Date Value Ref Range Status   08/30/2024 12 6 - 20 mg/dL Final     Creatinine   Date Value Ref Range Status   08/30/2024 0.7 0.5 - 1.4 mg/dL Final     Calcium   Date Value Ref Range Status   08/30/2024 10.0 8.7 - 10.5 mg/dL Final     Total Protein   Date Value Ref Range Status   08/30/2024 8.2 6.0 - 8.4 g/dL Final     Albumin   Date Value Ref Range Status   08/30/2024 4.0 3.5 - 5.2 g/dL Final     Total Bilirubin   Date Value Ref Range Status   08/30/2024 0.3 0.1 - 1.0 mg/dL Final     Comment:     For infants and newborns, interpretation of results should be based  on gestational age, weight and in agreement with clinical  observations.    Premature Infant recommended reference ranges:  Up to 24 hours.............<8.0 mg/dL  Up to 48 hours............<12.0 mg/dL  3-5 days..................<15.0 mg/dL  6-29 days.................<15.0 mg/dL       Alkaline Phosphatase   Date Value Ref Range Status   08/30/2024 50 (L) 55 - 135 U/L Final     AST   Date Value Ref Range Status   08/30/2024 11 10 - 40 U/L Final     ALT   Date Value Ref Range Status   08/30/2024 8  (L) 10 - 44 U/L Final     Anion Gap   Date Value Ref Range Status   08/30/2024 7 (L) 8 - 16 mmol/L Final     eGFR if    Date Value Ref Range Status   02/17/2016 SEE COMMENT >60 mL/min/1.73 m^2 Final     eGFR if non    Date Value Ref Range Status   02/17/2016 SEE COMMENT >60 mL/min/1.73 m^2 Final     Comment:     Calculation used to obtain the estimated glomerular filtration  rate (eGFR) is the CKD-EPI equation. Since race is unknown   in our information system, the eGFR values for   -American and Non--American patients are given   for each creatinine result.  Test not performed.  GFR calculation is only valid for patients   18 and older.       Lab Results   Component Value Date    HEPBSAB 23706.0 09/09/2024           MS Impression and Plan:     NEURO MULTIPLE SCLEROSIS IMPRESSION:   Number of relapses in the past year?:  0  Clinical Progression:  Clinically Stable  MRI Progression:  Stable  MS Classification:  Relapsing-Remitting MS  Current DMT: dimethyl fumarate  DMT:  No change in management  Symptom Management:  Implement change in symptom management  Implement Change in Symptom Management:  Fatigue, Mood and Sleep  Implement Change in Symptom Management comment:   Fatigue: will start modafinil, titrate up to 100mg BID  Mood: will increase Lexapro to 20mg daily and refer to psychiatry for med management. Unsure if increase of fatigue is possibly an increase of depression or sleep disorder. Explained to monitor for increased depression with increase of Lexapro and explained when to go to the ER if needed.   Sleep: refer to sleep clinic for management of possible narcolepsy.   Additional Impressions:   Patient remaining physically and radiologically stable on DMF.   Follow up in clinic in 3 months   Plan discussed and questions were answered to satisfaction.     Problem List Items Addressed This Visit       Multiple sclerosis, relapsing-remitting    Relevant  Medications    EScitalopram oxalate (LEXAPRO) 20 MG tablet    modafiniL (PROVIGIL) 200 MG Tab    Other Relevant Orders    Ambulatory referral/consult to Psychiatry    Ambulatory referral/consult to Sleep Disorders     Other Visit Diagnoses       Depression, unspecified depression type        Relevant Medications    EScitalopram oxalate (LEXAPRO) 20 MG tablet    Other Relevant Orders    Ambulatory referral/consult to Psychiatry    Narcolepsy due to underlying condition without cataplexy        Relevant Medications    modafiniL (PROVIGIL) 200 MG Tab    Fatigue, unspecified type        Relevant Medications    modafiniL (PROVIGIL) 200 MG Tab            I spent a total of 25 minutes on the day of the visit.This includes face to face time and non-face to face time preparing to see the patient (eg, review of tests), obtaining and/or reviewing separately obtained history, documenting clinical information in the electronic or other health record, independently interpreting results and communicating results to the patient/family/caregiver, or care coordinator.       Lupe Steele, ALIDAP-C

## 2025-01-09 ENCOUNTER — PATIENT MESSAGE (OUTPATIENT)
Dept: PULMONOLOGY | Facility: CLINIC | Age: 20
End: 2025-01-09
Payer: COMMERCIAL

## 2025-01-09 ENCOUNTER — OFFICE VISIT (OUTPATIENT)
Dept: SLEEP MEDICINE | Facility: CLINIC | Age: 20
End: 2025-01-09
Payer: COMMERCIAL

## 2025-01-09 VITALS — BODY MASS INDEX: 19.49 KG/M2 | HEIGHT: 65 IN | WEIGHT: 117 LBS

## 2025-01-09 DIAGNOSIS — G47.19 EXCESSIVE DAYTIME SLEEPINESS: ICD-10-CM

## 2025-01-09 DIAGNOSIS — G35 MULTIPLE SCLEROSIS, RELAPSING-REMITTING: ICD-10-CM

## 2025-01-09 DIAGNOSIS — G47.33 OSA (OBSTRUCTIVE SLEEP APNEA): Primary | ICD-10-CM

## 2025-01-09 RX ORDER — TRIAMCINOLONE ACETONIDE 1 MG/G
CREAM TOPICAL 2 TIMES DAILY PRN
COMMUNITY
Start: 2024-08-27

## 2025-01-09 NOTE — PROGRESS NOTES
The patient location is: [facility]  The chief complaint leading to consultation is:   Chief Complaint   Patient presents with    Fatigue    possible narcolepsy        Visit type: audiovisual    Face to Face time with patient: 10 mins  45 minutes of total time spent on the encounter, which includes face to face time and non-face to face time preparing to see the patient (eg, review of tests), Obtaining and/or reviewing separately obtained history, Documenting clinical information in the electronic or other health record, Independently interpreting results (not separately reported) and communicating results to the patient/family/caregiver, or Care coordination (not separately reported).         Each patient to whom he or she provides medical services by telemedicine is:  (1) informed of the relationship between the physician and patient and the respective role of any other health care provider with respect to management of the patient; and (2) notified that he or she may decline to receive medical services by telemedicine and may withdraw from such care at any time.    Notes:                                             Pulmonary Outpatient  Visit     Subjective:       Patient ID: Andree Zavala is a 19 y.o. female.    Social History     Tobacco Use   Smoking Status Never    Passive exposure: Never   Smokeless Tobacco Never            Chief Complaint: Fatigue and possible narcolepsy          Andree Zavala is 19 y.o.  Referred by Lupe Steele NP  [address] [place] [address]   Sleep issues  Dealing with for couple years  Sleep all time  Constant napping  Bed time: varied:8-9 pm   Naps between 11 am- 8 pm  Wake time: 7-9 am  Placed on modafinil by neurologist  Taking 200 mg BID  Occupation: nurse school  Dreams during naps  Lexapro for depression and anxiety  On weekends sleep in until 12 Noon, Bed time 10 pm  Never had PSG    Denies vivid dreams  Occasional sleep paralysis  No  cataplexy  Has sleep related hallacinations              Review of Systems   Constitutional:  Positive for fatigue.   Psychiatric/Behavioral:  Positive for sleep disturbance.        Outpatient Encounter Medications as of 1/9/2025   Medication Sig Dispense Refill    albuterol (VENTOLIN HFA) 90 mcg/actuation inhaler Inhale 2 puffs into the lungs every 6 (six) hours as needed for Wheezing. Rescue 8 g 0    dimethyl fumarate 240 mg CpDR Take 240 mg by mouth 2 (two) times daily. 60 capsule 5    ergocalciferol (ERGOCALCIFEROL) 50,000 unit Cap Take 1 capsule (50,000 Units total) by mouth every 7 days. Take 1 capsule weekly 12 capsule 3    EScitalopram oxalate (LEXAPRO) 20 MG tablet Take 1 tablet (20 mg total) by mouth once daily. 90 tablet 1    modafiniL (PROVIGIL) 200 MG Tab Take 1 tablet (200 mg total) by mouth once daily. (Patient taking differently: Take 200 mg by mouth 2 (two) times daily.) 90 tablet 1    norethindrone-ethinyl estradiol (OVCON) 0.4-35 mg-mcg per tablet Take 1 tablet by mouth once daily. 28 tablet 8    promethazine-dextromethorphan (PROMETHAZINE-DM) 6.25-15 mg/5 mL Syrp Take 5 mLs by mouth every 4 (four) hours as needed (for cough). 118 mL 0    triamcinolone acetonide 0.1% (KENALOG) 0.1 % cream Apply topically 2 (two) times daily as needed.      diazePAM (VALIUM) 5 MG tablet Take 1 tablet (5 mg total) by mouth as needed (Claustrophobia and anxiety in MRI). 2 tablet 0     No facility-administered encounter medications on file as of 1/9/2025.           Pertinent Work Up:      Pulmonary Interventions:      Smoking hx:    Environmental/Occupational hx:        Interval Hx:             The following portions of the patient's history were reviewed and updated as appropriate: She  has a past medical history of Asthma and Bipolar disorder, unspecified.  She does not have any pertinent problems on file.  She  has a past surgical history that includes Tonsillectomy and Adenoidectomy.  Her family history includes  Hypertension in her father; Lupus in her maternal aunt.  She  reports that she has never smoked. She has never been exposed to tobacco smoke. She has never used smokeless tobacco. She reports that she does not drink alcohol and does not use drugs.  She has a current medication list which includes the following prescription(s): albuterol, dimethyl fumarate, ergocalciferol, escitalopram oxalate, modafinil, norethindrone-ethinyl estradiol, promethazine-dextromethorphan, triamcinolone acetonide 0.1%, and diazepam.  Current Outpatient Medications on File Prior to Visit   Medication Sig Dispense Refill    albuterol (VENTOLIN HFA) 90 mcg/actuation inhaler Inhale 2 puffs into the lungs every 6 (six) hours as needed for Wheezing. Rescue 8 g 0    dimethyl fumarate 240 mg CpDR Take 240 mg by mouth 2 (two) times daily. 60 capsule 5    ergocalciferol (ERGOCALCIFEROL) 50,000 unit Cap Take 1 capsule (50,000 Units total) by mouth every 7 days. Take 1 capsule weekly 12 capsule 3    EScitalopram oxalate (LEXAPRO) 20 MG tablet Take 1 tablet (20 mg total) by mouth once daily. 90 tablet 1    modafiniL (PROVIGIL) 200 MG Tab Take 1 tablet (200 mg total) by mouth once daily. (Patient taking differently: Take 200 mg by mouth 2 (two) times daily.) 90 tablet 1    norethindrone-ethinyl estradiol (OVCON) 0.4-35 mg-mcg per tablet Take 1 tablet by mouth once daily. 28 tablet 8    promethazine-dextromethorphan (PROMETHAZINE-DM) 6.25-15 mg/5 mL Syrp Take 5 mLs by mouth every 4 (four) hours as needed (for cough). 118 mL 0    triamcinolone acetonide 0.1% (KENALOG) 0.1 % cream Apply topically 2 (two) times daily as needed.      diazePAM (VALIUM) 5 MG tablet Take 1 tablet (5 mg total) by mouth as needed (Claustrophobia and anxiety in MRI). 2 tablet 0     No current facility-administered medications on file prior to visit.     She has No Known Allergies..      BP Readings from Last 3 Encounters:   12/12/24 131/78   09/09/24 128/72   08/30/24 133/86  "    Snoring / Sleep:     [place] Questionnaire (validated JAZMIN screening questionnaire)    YES -- Snoring/apnea    YES -- Fatigue    Body mass index is 19.47 kg/m².  (>25 is overweight, >30 is obese)    Blood Pressure = normal blood pressure  (PreHTN 120-139/80-89, Stg1 140-159/90-99, Stg2 >160/>100)  [place] = 2 of three JAZMIN categories are positive (high risk is 2-3 positive categories)     [place] Sleepiness Scale TOTAL =          1/9/2025   EPWORTH SLEEPINESS SCALE   Sitting and reading 2   Watching TV 3   Sitting, inactive in a public place (e.g. a theatre or a meeting) 2   As a passenger in a car for an hour without a break 3   Lying down to rest in the afternoon when circumstances permit 3   Sitting and talking to someone 0   Sitting quietly after a lunch without alcohol 2   In a car, while stopped for a few minutes in traffic 0   Total score 15      (validated sleepiness questionnaire with a higher score indicating greater sleepiness; range 0-24)  Failed to redirect to the Timeline version of the [employer].    STOP-Bang Questionnaire (validated JAZMIN screening questionnaire)  Negative unless checked off.  [x] Snoring    [x]  Tired/Fatigued/Sleepy  [] Obstruction (apneas/choking)  [] Pressure (HTN)  [] BMI >35  [] Age >50  [] Neck >40 cm  [] Gender male   STOP-Bang = 2 (low risk 0-2,high risk 3-8)         MMRC Dyspnea Scale (4 is worst)     [] MMRC 0: Dyspneic on strenuous excercise (0 points)    [] MMRC 1: Dyspneic on walking a slight hill (0 points)    [] MMRC 2: Dyspneic on walking level ground; must stop occasionally due to breathlessness (1 point)    [] MMRC 3: Must stop for breathlessness after walking 100 yards or after a few minutes (2 points)    [] MMRC 4: Cannot leave house; breathless on dressing/undressing (3 points)                          No data to display                          Objective:     Vital Signs (Most Recent)  Height and Weight  Height: 5' 5" (165.1 cm)  Weight: 53.1 kg (117 " "lb)  BSA (Calculated - sq m): 1.56 sq meters  BMI (Calculated): 19.5  Weight in (lb) to have BMI = 25: 149.9]  Wt Readings from Last 2 Encounters:   01/09/25 53.1 kg (117 lb)   12/12/24 54.2 kg (119 lb 9.6 oz)       Physical Exam  Vitals and nursing note reviewed.   HENT:      Head: Normocephalic and atraumatic.   Eyes:      Pupils: Pupils are equal, round, and reactive to light.   Neurological:      General: No focal deficit present.      Mental Status: She is alert.          Laboratory  Lab Results   Component Value Date    WBC 9.47 08/30/2024    RBC 4.10 08/30/2024    HGB 11.4 (L) 08/30/2024    HCT 36.4 (L) 08/30/2024    MCV 89 08/30/2024    MCH 27.8 08/30/2024    MCHC 31.3 (L) 08/30/2024    RDW 14.6 (H) 08/30/2024     08/30/2024    MPV 11.1 08/30/2024    GRAN 5.3 08/30/2024    GRAN 55.7 08/30/2024    LYMPH 3.2 08/30/2024    LYMPH 33.3 08/30/2024    MONO 0.9 08/30/2024    MONO 9.5 08/30/2024    EOS 0.1 08/30/2024    BASO 0.03 08/30/2024    EOSINOPHIL 0.8 08/30/2024    BASOPHIL 0.3 08/30/2024       BMP  Lab Results   Component Value Date     08/30/2024    K 3.9 08/30/2024     08/30/2024    CO2 26 08/30/2024    BUN 12 08/30/2024    CREATININE 0.7 08/30/2024    CALCIUM 10.0 08/30/2024    ANIONGAP 7 (L) 08/30/2024    ESTGFRAFRICA SEE COMMENT 02/17/2016    EGFRNONAA SEE COMMENT 02/17/2016    AST 11 08/30/2024    ALT 8 (L) 08/30/2024    PROT 8.2 08/30/2024          No results found for: "IGE"     No results found for: "ASPERGILLUS"  No results found for: "AFUMIGATUSCL"     No results found for: "ACE"     Diagnostic Results:  I have personally reviewed today the following studies:    MRI Thoracic Spine Demyelinating Without Contrast  Narrative: EXAM: MRI THORACIC SPINE DEMYELINATING WITHOUT CONTRAST    CLINICAL INDICATION: Multiple sclerosis    TECHNIQUE:  MR thoracic spine without contrast.  Sagittal T1, T2, and STIR.  Axial T1 and T2.  Coronal T1.    FINDINGS: Comparison is made to thoracic spine " MRI 12/07/2023.   Vertebral body height is normal and alignment is maintained.  Marrow signal is within normal limits.  Redemonstration of 2 intramedullary foci of increased T2 signal at T10 and T11.  No new white matter lesions.    No severe spinal canal stenosis or neuroforaminal narrowing.  Regional soft tissues are unremarkable.  Impression: Stable exam with redemonstration of white matter changes in the cord at T10 and T11 consistent with history of demyelinating disease.  No new white matter lesions.    Finalized on: 10/2/2024 2:02 PM By:  Kathrin Kelly MD  [facility]# [id number]      2024-10-02 14:04:41.173    [facility]  MRI Cervical Spine Demyelinating Without Contrast  Narrative: PROCEDURE:  MRI CERVICAL SPINE DEMYELINATING WITHOUT CONTRAST    INDICATION:  MS    TECHNIQUE: Multiplanar multisequence MRI of the cervical spine without contrast.    COMPARISON: Cervical spine MRI 12/07/2023 report is available.    FINDINGS:  Alignment is normal. No evidence of aggressive osseous lesion or acute compression deformity. Questionable focal increased T2 hyperintensity in the right cord at the level of C4 and in the posterior cord at C5-6 and on the left at C7.    No severe spinal canal stenosis or neuroforaminal narrowing.    Paraspinal soft tissues are unremarkable.  Impression: Possible increased T2 hyperintense lesions in the cervical cord as above consistent with history of demyelinating disease.    Finalized on: 10/2/2024 1:03 PM By:  Kathrin Kelly MD  [facility]# [id number]      2024-10-02 13:06:06.237    [facility]  MRI Brain Demyelinating Without Contrast  Narrative: PROCEDURE:  MRI BRAIN DEMYELINATING WITHOUT CONTRAST    INDICATION:  MS    TECHNIQUE: Multiplanar multisequence MRI of the brain without contrast.    COMPARISON: No images available for comparison however outside report of an MRI of the brain from 12/07/2023 2 available.    FINDINGS:  No evidence of acute territorial infarct, intracranial hemorrhage, or mass lesion. Parenchymal and  "ventricular volumes are normal. Vascular flow voids are unremarkable.    Scattered T2 hyperintense foci in the centrum semiovale and subcortical white matter.  Lesion in the right cerebellar peduncle is described on the outside report as well.    Cranium is unremarkable. Orbits and orbital contents are normal. Paranasal sinuses are clear.  Impression: 1.    White matter changes as above, consistent with history of demyelinating disease.  2.    Evaluation for new active disease is limited due to lack of contrast.  3.    No acute intracranial abnormality.    Finalized on: 10/2/2024 12:15 PM By:  Kathrin Kelly MD  R# [id number]      2024-10-02 12:17:52.963    [facility]           Assessment/Plan:          1. JAZMIN (obstructive sleep apnea)    2. Multiple sclerosis, relapsing-remitting  -     Ambulatory referral/consult to Sleep Disorders  -     Polysomnography with mslt; Future  -     Toxicology Screen, Urine; Future; Expected date: 01/09/2025    3. Excessive daytime sleepiness  Assessment & Plan:  Very suspicious for narcolepsy    MSLT    Orders:  -     Polysomnography with mslt; Future  -     Toxicology Screen, Urine; Future; Expected date: 01/09/2025      Patient was educated about the symptoms and signs of drowsy driving and about effective countermeasures. Symptoms of drowsy driving include difficulty focusing, frequent blinking, heavy eyelids, daydreaming, wandering/disconnected thoughts, difficulty remembering the last few miles driven (sometimes called "automatic behavior") or missing exits and street signs, frequent yawning, rubbing eyes, difficulty keeping head up, drifting from nicole to nicole, tailgating or hitting a shoulder, and feeling restless and irritable .  Patient was made  aware that the ability to self-rate sleepiness and performance is unreliable . Although performance continues to decline with cumulative days of sleep deprivation, subjective ratings of sleepiness tend to level off after the first few days in " laboratory conditions of sleep deprivation , and drivers are often unaware of their own level of sleepiness .  Rather than driving while drowsy, patient was told to use other modes of transportation, such as ride sharing, public transportation, taxis, or walking. Importantly, drivers should be advised to plan ahead so that they avoid driving during times of day when they are likely to be sleepy, such as mid-afternoon, late at night, or after a period of sleep deprivation; to keep their trips short (under 20 minutes); and to use alternative modes of transportation.  Patient was warned about the risk of driving while drowsy.  Patient was instructed that patients who are considered high-risk (eg, those with excessive daytime sleepiness and a history of a drowsy driving crash or near miss) should be warned not to drive until therapy has been instituted and proven effective.      Follow up in about 6 weeks (around 2/20/2025), or MSLT, sleep diary, send SDI, needs to be off provigil 7 days before test.    This note was prepared using voice recognition system and is likely to have sound alike errors that may have been overlooked even after proof reading.  Please call me with any questions    Discussed diagnosis, its evaluation, treatment and usual course. All questions answered.    The patient was given open opportunity to ask questions and/or express concerns about treatment plan.   All questions/concerns were discussed.       Two patient identifiers used prior to evaluation.     Thank you for the courtesy of participating in the care of this patient    Giles Miles MD      Personal Diagnostic Review  []  CXR    []  ECHO    []  ONSAT    []  6MWD    []  LABS    []  CHEST CT    []  PET CT    []  Biopsy results

## 2025-01-09 NOTE — PATIENT INSTRUCTIONS
Table includes commonly encountered medications or those requiring a prolonged washout period, but it is not an exhaustive list. A 2-week washout is generally  recommended. *Medication agents with long half-lives and longer washout (up to 6 weeks) may be needed. CBG = cannabigerol, MAOIs = monoamine oxidase  inhibitors, NBRAs = nonbenzodiazepine receptor agonists, SNRIs = serotonin noradrenergic reuptake inhibitors, SSRIs = selective serotonin reuptake agonists.        Downloaded from jcsm.aasm.org by [phone] on January 10, 2023.  Journal of Clinical Sleep Medicine, Vol. 17, No. 12  ISRAEL Munson AY Avidan, et al. Recommended protocols for the MSLT and MWT  December 1, 2021  Copyright 2023 American Academy of Sleep Medicine

## 2025-01-10 ENCOUNTER — TELEPHONE (OUTPATIENT)
Dept: NEUROLOGY | Facility: CLINIC | Age: 20
End: 2025-01-10
Payer: COMMERCIAL

## 2025-01-10 DIAGNOSIS — G35 MULTIPLE SCLEROSIS, RELAPSING-REMITTING: ICD-10-CM

## 2025-01-10 RX ORDER — DIMETHYL FUMARATE 240 MG/1
240 CAPSULE ORAL 2 TIMES DAILY
Qty: 60 CAPSULE | Refills: 0 | Status: SHIPPED | OUTPATIENT
Start: 2025-01-10

## 2025-01-10 NOTE — TELEPHONE ENCOUNTER
----- Message from Madyson Jimenez sent at 1/10/2025 12:05 PM CST -----  Regarding: FW: Medication Refills    ----- Message -----  From: Stephania Haywood MA  Sent: 1/10/2025   9:10 AM CST  To: Pedro Luis DAVALOS Staff; Cedric Andrade Staff  Subject: Medication Refills                               Good morning,     The patient saw us previously and has now followed up with you all moving forward. We received a refill request. Being she is now under your care, could you please refill this medication if you find it necessary? If we can assist in anyway, please let us know.     Happy Friday!    Thanks,   CHECO Cat    ----- Message -----   From: Gibson Brandon   Sent: 1/9/2025  12:02 PM CST   To: Burak Aguilar Staff     .Type:  RX Refill Request     Who Called:  Chanda   Refill or New Rx: Refill   RX Name and Strength: dimethyl fumarate 240 mg CpDR   How is the patient currently taking it? (ex. 1XDay): Take 240 mg by mouth 2 (two) times daily   Is this a 30 day or 90 day RX: 30 day   Preferred Pharmacy with phone number:.[facility] - [place], [place] -   [address]   [place] [address]   Phone: [phone] Fax: [phone]   Local or Mail Order: Mail Order   Ordering Provider: Jeff Rodríguez   Would the patient rather a call back or a response via [facility]?  No   Best Call Back Number:   Additional Information:       Thanks

## 2025-01-16 RX ORDER — MODAFINIL 200 MG/1
200 TABLET ORAL 2 TIMES DAILY
Qty: 180 TABLET | Refills: 1 | Status: SHIPPED | OUTPATIENT
Start: 2025-01-16

## 2025-01-23 DIAGNOSIS — G35 MULTIPLE SCLEROSIS, RELAPSING-REMITTING: ICD-10-CM

## 2025-01-23 RX ORDER — DIMETHYL FUMARATE 240 MG/1
1 CAPSULE ORAL 2 TIMES DAILY
Qty: 60 CAPSULE | Refills: 0 | Status: SHIPPED | OUTPATIENT
Start: 2025-01-23

## 2025-02-06 ENCOUNTER — TELEPHONE (OUTPATIENT)
Dept: NEUROLOGY | Facility: CLINIC | Age: 20
End: 2025-02-06
Payer: COMMERCIAL

## 2025-02-06 NOTE — TELEPHONE ENCOUNTER
----- Message from Madyson Jimenez sent at 2/6/2025 11:26 AM CST -----  Regarding: FW: Pharm called wld like to speak with someone regarding PA for below Rx    ----- Message -----  From: Mag Lanier  Sent: 2/6/2025   9:23 AM CST  To: Cedric Andrade Staff  Subject: Pharm called wld like to speak with someone #    Pharmacy Calling to Clarify an RX       Name of Caller# Shreya        Pharmacy Name#   [facility]  [address] [place] [address]  Phone: [phone] Fax: [phone]           Prescription Name# dimethyl fumarate 240 mg CpDR        What do they need to clarify?Pharm called wld like to speak with someone regarding PA for below Rx. Please advise             Additional Information:

## 2025-02-10 ENCOUNTER — OFFICE VISIT (OUTPATIENT)
Dept: URGENT CARE | Facility: CLINIC | Age: 20
End: 2025-02-10
Payer: COMMERCIAL

## 2025-02-10 VITALS
RESPIRATION RATE: 16 BRPM | HEART RATE: 136 BPM | TEMPERATURE: 99 F | SYSTOLIC BLOOD PRESSURE: 118 MMHG | HEIGHT: 65 IN | WEIGHT: 118.94 LBS | DIASTOLIC BLOOD PRESSURE: 65 MMHG | BODY MASS INDEX: 19.82 KG/M2 | OXYGEN SATURATION: 100 %

## 2025-02-10 DIAGNOSIS — F41.0 PANIC ATTACK: Primary | ICD-10-CM

## 2025-02-10 PROCEDURE — 99213 OFFICE O/P EST LOW 20 MIN: CPT | Mod: S$GLB,,,

## 2025-02-10 RX ORDER — HYDROXYZINE HYDROCHLORIDE 50 MG/1
50 TABLET, FILM COATED ORAL 4 TIMES DAILY PRN
Qty: 20 TABLET | Refills: 0 | Status: SHIPPED | OUTPATIENT
Start: 2025-02-10

## 2025-02-10 NOTE — PROGRESS NOTES
"Subjective:      Patient ID: Andree Zavala is a 19 y.o. female.    Vitals:  height is 5' 5" (1.651 m) and weight is 53.9 kg (118 lb 15 oz). Her temperature is 98.7 °F (37.1 °C). Her blood pressure is 118/65 and her pulse is 136 (abnormal). Her respiration is 16 and oxygen saturation is 100%.     Chief Complaint: Panic Attack    Andree Zavala is a 19 y.o. female with hx of depression and anxiety who presents for panic attack which onset this morning. She states she had a panic attack this morning where she states her hands and face felt numb and she began to shake uncontrollably. Currently takes Lexapro. During the attack she was experiencing nausea, dizziness, SOB, choking, muscle tremors. Now she is only experiencing tingling in hands and feet. Notes hx of MS. Patient denies any fever, chills, SOB, CP, abd pain, n/v/d, rash, dizziness, blurred vision, HA. Prior Tx includes albuterol. Denies SI or HI. No hallucinations. States increased anxiety about missing clinicals this morning due to oversleeping. Notes a similar attack a week ago.     Anxiety  Presents for initial visit. The problem has been gradually improving. Symptoms include dizziness, excessive worry, feeling of choking, hyperventilation, insomnia, irritability, nausea, nervous/anxious behavior, panic and shortness of breath. Patient reports no chest pain, compulsions, confusion, decreased concentration, depressed mood, dry mouth, dysphagia, dyspnea, malaise, muscle tension, obsessions, palpitations, restlessness or suicidal ideas. Symptoms occur occasionally. The most recent episode lasted 1 hour. The severity of symptoms is moderate. The symptoms are aggravated by social activities and family issues. The quality of sleep is good. Nighttime awakenings: none.     Her past medical history is significant for anxiety/panic attacks, bipolar disorder and depression. There is no history of anemia, arrhythmia, asthma, hyperthyroidism, school failure or " suicidal ideation. Past treatments include relaxation techniques. Compliance with prior treatments has been variable.       Constitution: Negative for chills, sweating and fever.   HENT:  Negative for trouble swallowing.    Cardiovascular:  Negative for chest pain and palpitations.   Respiratory:  Positive for shortness of breath.    Gastrointestinal:  Positive for nausea. Negative for abdominal pain, vomiting and diarrhea.   Neurological:  Positive for dizziness. Negative for headaches, numbness and tingling.   Psychiatric/Behavioral:  Positive for nervous/anxious. Negative for confusion and suicidal ideas. The patient is nervous/anxious and has insomnia.       Objective:     Physical Exam   Constitutional: She is oriented to person, place, and time. She appears well-developed.  Non-toxic appearance. She does not appear ill. No distress.   HENT:   Head: Normocephalic and atraumatic.   Ears:   Right Ear: Hearing, tympanic membrane, external ear and ear canal normal.   Left Ear: Hearing, tympanic membrane, external ear and ear canal normal.   Nose: Nose normal. No mucosal edema, rhinorrhea or nasal deformity. No epistaxis. Right sinus exhibits no maxillary sinus tenderness and no frontal sinus tenderness. Left sinus exhibits no maxillary sinus tenderness and no frontal sinus tenderness.   Mouth/Throat: Uvula is midline, oropharynx is clear and moist and mucous membranes are normal. No trismus in the jaw. Normal dentition. No uvula swelling. No posterior oropharyngeal erythema.   Eyes: Conjunctivae, EOM and lids are normal. Pupils are equal, round, and reactive to light. Right conjunctiva is not injected. Left conjunctiva is not injected. No scleral icterus. Right eye exhibits no nystagmus. Left eye exhibits no nystagmus. Extraocular movement intact vision grossly intact gaze aligned appropriately   Neck: Trachea normal and phonation normal. Neck supple. No neck rigidity present.   Cardiovascular: Normal rate, regular  rhythm, normal heart sounds and normal pulses.   Pulmonary/Chest: Effort normal and breath sounds normal. No respiratory distress. She has no wheezes. She has no rales. She exhibits no tenderness.   Abdominal: Normal appearance and bowel sounds are normal. She exhibits no distension. Soft. There is no abdominal tenderness. There is no rebound and no guarding.   Musculoskeletal: Normal range of motion.         General: No deformity. Normal range of motion.   Neurological: She is alert and oriented to person, place, and time. She has normal sensation and intact cranial nerves (2-12). No cranial nerve deficit. She exhibits normal muscle tone. She shows no pronator drift. Coordination normal. Coordination normal.   Skin: Skin is warm, dry, intact, not diaphoretic and not pale.   Psychiatric: She experiences Normal attention. She experiences No auditory hallucinations and No visual hallucinations. Her speech is normal and behavior is normal. Judgment and thought content normal. Her mood appears anxious.   Nursing note and vitals reviewed.      Assessment:     1. Panic attack        Plan:       Panic attack  -     hydrOXYzine (ATARAX) 50 MG tablet; Take 1 tablet (50 mg total) by mouth 4 (four) times daily as needed for Anxiety.  Dispense: 20 tablet; Refill: 0      Afebrile. VSS.   Symptoms seem to be resolving after attack.  Meds: hydroxyzine sent to pharmacy  Discussed coping techniques. Reassurance given.  She notes upcoming appt with Neurology. Will hold off on any additional referrals at this time.  RTC as needed.  ER precautions given for SOB, CP, HA, slurred speech, blurred vision, or confusion. Patient exits exam room in NAD.

## 2025-02-10 NOTE — PATIENT INSTRUCTIONS
You must understand that you have received Urgent Care treatment only and that you may be released before all of your medical problems are known or treated.   Return to Urgent Care or go to ER if symptoms worsen or fail to improve.    If chest pain worsens and you develop additional symptoms such as Shortness of Breath, changes in mental status, profuse sweating or loss of consciousness, then you must go to the ER or call 911.

## 2025-02-10 NOTE — LETTER
February 10, 2025      [facility] [address] [place] [address]  Phone: [phone]  Fax: [phone]       Patient: Andree Zavala   YOB: 2005  Date of Visit: 02/10/2025    To Whom It May Concern:    Joesph Zavala  was at [facility] on 02/10/2025. The patient may return to work/school on 02/11/2025 with no restrictions. If you have any questions or concerns, or if I can be of further assistance, please do not hesitate to contact me.    Sincerely,    Niya French PA-C

## 2025-02-11 ENCOUNTER — LAB VISIT (OUTPATIENT)
Dept: LAB | Facility: HOSPITAL | Age: 20
End: 2025-02-11
Payer: COMMERCIAL

## 2025-02-11 DIAGNOSIS — G35 MULTIPLE SCLEROSIS, RELAPSING-REMITTING: ICD-10-CM

## 2025-02-11 LAB
ALBUMIN SERPL BCP-MCNC: 4.3 G/DL (ref 3.5–5.2)
ALP SERPL-CCNC: 68 U/L (ref 40–150)
ALT SERPL W/O P-5'-P-CCNC: 14 U/L (ref 10–44)
ANION GAP SERPL CALC-SCNC: 9 MMOL/L (ref 8–16)
AST SERPL-CCNC: 17 U/L (ref 10–40)
BASOPHILS # BLD AUTO: 0.02 K/UL (ref 0–0.2)
BASOPHILS NFR BLD: 0.3 % (ref 0–1.9)
BILIRUB SERPL-MCNC: 0.5 MG/DL (ref 0.1–1)
BUN SERPL-MCNC: 14 MG/DL (ref 6–20)
CALCIUM SERPL-MCNC: 10 MG/DL (ref 8.7–10.5)
CHLORIDE SERPL-SCNC: 102 MMOL/L (ref 95–110)
CO2 SERPL-SCNC: 24 MMOL/L (ref 23–29)
CREAT SERPL-MCNC: 0.6 MG/DL (ref 0.5–1.4)
DIFFERENTIAL METHOD BLD: ABNORMAL
EOSINOPHIL # BLD AUTO: 0.2 K/UL (ref 0–0.5)
EOSINOPHIL NFR BLD: 2.6 % (ref 0–8)
ERYTHROCYTE [DISTWIDTH] IN BLOOD BY AUTOMATED COUNT: 13.5 % (ref 11.5–14.5)
EST. GFR  (NO RACE VARIABLE): >60 ML/MIN/1.73 M^2
GLUCOSE SERPL-MCNC: 83 MG/DL (ref 70–110)
HCT VFR BLD AUTO: 37.2 % (ref 37–48.5)
HGB BLD-MCNC: 11.6 G/DL (ref 12–16)
IMM GRANULOCYTES # BLD AUTO: 0.01 K/UL (ref 0–0.04)
IMM GRANULOCYTES NFR BLD AUTO: 0.2 % (ref 0–0.5)
LYMPHOCYTES # BLD AUTO: 1.8 K/UL (ref 1–4.8)
LYMPHOCYTES NFR BLD: 29.5 % (ref 18–48)
MCH RBC QN AUTO: 27.9 PG (ref 27–31)
MCHC RBC AUTO-ENTMCNC: 31.2 G/DL (ref 32–36)
MCV RBC AUTO: 89 FL (ref 82–98)
MONOCYTES # BLD AUTO: 0.7 K/UL (ref 0.3–1)
MONOCYTES NFR BLD: 11.3 % (ref 4–15)
NEUTROPHILS # BLD AUTO: 3.4 K/UL (ref 1.8–7.7)
NEUTROPHILS NFR BLD: 56.1 % (ref 38–73)
NRBC BLD-RTO: 0 /100 WBC
PLATELET # BLD AUTO: 270 K/UL (ref 150–450)
PMV BLD AUTO: 10.9 FL (ref 9.2–12.9)
POTASSIUM SERPL-SCNC: 4.7 MMOL/L (ref 3.5–5.1)
PROT SERPL-MCNC: 7.7 G/DL (ref 6–8.4)
RBC # BLD AUTO: 4.16 M/UL (ref 4–5.4)
SODIUM SERPL-SCNC: 135 MMOL/L (ref 136–145)
WBC # BLD AUTO: 6.1 K/UL (ref 3.9–12.7)

## 2025-02-11 PROCEDURE — 36415 COLL VENOUS BLD VENIPUNCTURE: CPT

## 2025-02-11 PROCEDURE — 86359 T CELLS TOTAL COUNT: CPT

## 2025-02-11 PROCEDURE — 80053 COMPREHEN METABOLIC PANEL: CPT

## 2025-02-11 PROCEDURE — 86360 T CELL ABSOLUTE COUNT/RATIO: CPT

## 2025-02-11 PROCEDURE — 85025 COMPLETE CBC W/AUTO DIFF WBC: CPT

## 2025-02-12 LAB
ABSOLUTE CD3: 1364 CELLS/UL (ref 700–2100)
ABSOLUTE CD8: 328 CELLS/UL (ref 200–900)
CD3%: 72.3 % (ref 55–83)
CD3+CD4+ CELLS # BLD: 981 CELLS/UL (ref 300–1400)
CD3+CD4+ CELLS NFR BLD: 52 % (ref 28–57)
CD4/CD8 RATIO: 2.99 (ref 0.9–3.6)
CD8 % SUPPRESSOR T CELL: 17.4 % (ref 10–39)

## 2025-02-14 ENCOUNTER — TELEPHONE (OUTPATIENT)
Dept: NEUROLOGY | Facility: CLINIC | Age: 20
End: 2025-02-14
Payer: COMMERCIAL

## 2025-02-17 DIAGNOSIS — G35 MULTIPLE SCLEROSIS, RELAPSING-REMITTING: ICD-10-CM

## 2025-02-18 RX ORDER — DIMETHYL FUMARATE 240 MG/1
1 CAPSULE ORAL 2 TIMES DAILY
Qty: 180 CAPSULE | Refills: 1 | Status: SHIPPED | OUTPATIENT
Start: 2025-02-18

## 2025-02-19 DIAGNOSIS — G47.429 NARCOLEPSY DUE TO UNDERLYING CONDITION WITHOUT CATAPLEXY: ICD-10-CM

## 2025-02-19 DIAGNOSIS — R53.83 FATIGUE, UNSPECIFIED TYPE: ICD-10-CM

## 2025-02-19 DIAGNOSIS — G35 MULTIPLE SCLEROSIS, RELAPSING-REMITTING: ICD-10-CM

## 2025-02-21 RX ORDER — MODAFINIL 200 MG/1
200 TABLET ORAL 2 TIMES DAILY
Qty: 180 TABLET | Refills: 1 | Status: SHIPPED | OUTPATIENT
Start: 2025-02-21

## 2025-03-10 ENCOUNTER — PATIENT MESSAGE (OUTPATIENT)
Dept: PSYCHIATRY | Facility: CLINIC | Age: 20
End: 2025-03-10
Payer: COMMERCIAL

## 2025-03-14 ENCOUNTER — PATIENT MESSAGE (OUTPATIENT)
Dept: NEUROLOGY | Facility: CLINIC | Age: 20
End: 2025-03-14
Payer: COMMERCIAL

## 2025-03-18 DIAGNOSIS — F32.A DEPRESSION, UNSPECIFIED DEPRESSION TYPE: ICD-10-CM

## 2025-03-18 DIAGNOSIS — R53.83 FATIGUE, UNSPECIFIED TYPE: ICD-10-CM

## 2025-03-18 DIAGNOSIS — G47.429 NARCOLEPSY DUE TO UNDERLYING CONDITION WITHOUT CATAPLEXY: ICD-10-CM

## 2025-03-18 DIAGNOSIS — G35 MULTIPLE SCLEROSIS, RELAPSING-REMITTING: ICD-10-CM

## 2025-03-19 RX ORDER — ERGOCALCIFEROL 1.25 MG/1
50000 CAPSULE ORAL
Qty: 12 CAPSULE | Refills: 3 | Status: SHIPPED | OUTPATIENT
Start: 2025-03-19 | End: 2026-03-19

## 2025-03-19 RX ORDER — ESCITALOPRAM OXALATE 20 MG/1
20 TABLET ORAL DAILY
Qty: 90 TABLET | Refills: 1 | Status: SHIPPED | OUTPATIENT
Start: 2025-03-19

## 2025-03-19 RX ORDER — MODAFINIL 200 MG/1
200 TABLET ORAL 2 TIMES DAILY
Qty: 180 TABLET | Refills: 1 | Status: SHIPPED | OUTPATIENT
Start: 2025-03-19

## 2025-04-10 ENCOUNTER — OFFICE VISIT (OUTPATIENT)
Dept: URGENT CARE | Facility: CLINIC | Age: 20
End: 2025-04-10
Payer: COMMERCIAL

## 2025-04-10 VITALS
WEIGHT: 121.25 LBS | HEART RATE: 107 BPM | HEIGHT: 65 IN | BODY MASS INDEX: 20.2 KG/M2 | RESPIRATION RATE: 18 BRPM | SYSTOLIC BLOOD PRESSURE: 120 MMHG | TEMPERATURE: 98 F | DIASTOLIC BLOOD PRESSURE: 59 MMHG | OXYGEN SATURATION: 97 %

## 2025-04-10 DIAGNOSIS — B37.9 ANTIBIOTIC-INDUCED YEAST INFECTION: ICD-10-CM

## 2025-04-10 DIAGNOSIS — T36.95XA ANTIBIOTIC-INDUCED YEAST INFECTION: ICD-10-CM

## 2025-04-10 DIAGNOSIS — L02.214 ABSCESS OF LEFT GROIN: Primary | ICD-10-CM

## 2025-04-10 DIAGNOSIS — L02.419 AXILLARY ABSCESS: ICD-10-CM

## 2025-04-10 PROCEDURE — 99213 OFFICE O/P EST LOW 20 MIN: CPT | Mod: S$GLB,,, | Performed by: PHYSICIAN ASSISTANT

## 2025-04-10 RX ORDER — MUPIROCIN 20 MG/G
OINTMENT TOPICAL 3 TIMES DAILY
Qty: 30 G | Refills: 0 | Status: SHIPPED | OUTPATIENT
Start: 2025-04-10 | End: 2025-04-20

## 2025-04-10 RX ORDER — CEPHALEXIN 500 MG/1
500 CAPSULE ORAL EVERY 12 HOURS
Qty: 14 CAPSULE | Refills: 0 | Status: SHIPPED | OUTPATIENT
Start: 2025-04-10 | End: 2025-04-17

## 2025-04-10 RX ORDER — FLUCONAZOLE 150 MG/1
150 TABLET ORAL ONCE
Qty: 2 TABLET | Refills: 0 | Status: SHIPPED | OUTPATIENT
Start: 2025-04-10 | End: 2025-04-10

## 2025-04-10 NOTE — PROGRESS NOTES
"Subjective:      Patient ID: Andree Zavala is a 19 y.o. female.    Vitals:  height is 5' 5" (1.651 m) and weight is 55 kg (121 lb 4.1 oz). Her tympanic temperature is 97.7 °F (36.5 °C). Her blood pressure is 120/59 (abnormal) and her pulse is 107. Her respiration is 18 and oxygen saturation is 97%.     Chief Complaint: No chief complaint on file.    Patient present with abscess under her right arm and left groin area. On set of symptoms 5 days ago. Last absence was years ago. OTC - ingrown hair salve.     Abscess  Chronicity:  NewProgression Since Onset: gradually worsening  Location:  Shoulder/arm and ano-genital (right under arm, left groin area)  Associated Symptoms: no fever, no chills, no sweats  Characteristics: painful and blistering    Characteristics: not draining, no itching, no redness, no dryness, no scaling, no peeling, no swelling and no bruising    Pain Scale:  7/10  Ineffective treatments: Prid - pain relief salve.  Relieved by: Prid - pain relief salve.  Worsened by:  Nothing    Constitution: Negative for chills and fever.   Skin:  Positive for abscess.    Objective:     Physical Exam    Assessment:     No diagnosis found.    Plan:       There are no diagnoses linked to this encounter.                "

## 2025-04-10 NOTE — PROGRESS NOTES
"Subjective:      Patient ID: Andree Zavala is a 19 y.o. female.    Vitals:  height is 5' 5" (1.651 m) and weight is 55 kg (121 lb 4.1 oz). Her tympanic temperature is 97.7 °F (36.5 °C). Her blood pressure is 120/59 (abnormal) and her pulse is 107. Her respiration is 18 and oxygen saturation is 97%.     Chief Complaint: Abscess    Patient present with abscess under her right arm and left groin area. Onset of symptoms 5 days ago. Last abscess was years ago. OTC - ingrown hair salve. Shaved right armpit recently on Tuesday. Has not recently shaved or waved the pubic area.     Abscess  Chronicity:  NewProgression Since Onset: gradually worsening  Location:  Shoulder/arm and ano-genital (right under arm, left groin area)  Associated Symptoms: no fever, no chills, no sweats  Characteristics: painful and blistering    Characteristics: not draining, no itching, no redness, no dryness, no scaling, no peeling, no swelling and no bruising    Pain Scale:  7/10  Ineffective treatments: Prid - pain relief salve.  Relieved by: Prid - pain relief salve.  Worsened by:  Nothing      Abscess  Associated Symptoms: no fever, no chills      Constitution: Negative. Negative for chills and fever.   HENT: Negative.     Neck: neck negative.   Cardiovascular: Negative.    Eyes: Negative.    Respiratory: Negative.     Gastrointestinal: Negative.    Genitourinary: Negative.    Musculoskeletal: Negative.    Skin:  Positive for abscess.   Neurological: Negative.       Objective:     Physical Exam   Constitutional:  Non-toxic appearance. No distress.   HENT:   Head: Normocephalic and atraumatic.   Nose: Nose normal.   Mouth/Throat: Mucous membranes are moist.   Eyes: Conjunctivae are normal. Extraocular movement intact   Neck: No neck rigidity present.   Cardiovascular: Normal rate, regular rhythm and normal heart sounds.   No murmur heard.Exam reveals no gallop.   Pulmonary/Chest: Effort normal and breath sounds normal. No stridor. No " respiratory distress. She has no wheezes. She has no rhonchi. She has no rales.   Abdominal: Soft. There is no abdominal tenderness.   Musculoskeletal: Normal range of motion.         General: Normal range of motion.   Lymphadenopathy:     She has no cervical adenopathy.   Neurological: no focal deficit. She is alert.   Skin: Capillary refill takes less than 2 seconds.         Comments: There is an area of tenderness and swelling with induration and no fluctuance noted to to the left groin. There is a small tender mobile area of swelling noted to the right axilla that is not indurated or fluctuant but tender to palpation.   Nursing note and vitals reviewed.      Assessment:     1. Abscess of left groin    2. Axillary abscess    3. Antibiotic-induced yeast infection        Plan:       Abscess of left groin  -     cephALEXin (KEFLEX) 500 MG capsule; Take 1 capsule (500 mg total) by mouth every 12 (twelve) hours. for 7 days  Dispense: 14 capsule; Refill: 0  -     mupirocin (BACTROBAN) 2 % ointment; Apply topically 3 (three) times daily. for 10 days  Dispense: 30 g; Refill: 0    Axillary abscess  -     cephALEXin (KEFLEX) 500 MG capsule; Take 1 capsule (500 mg total) by mouth every 12 (twelve) hours. for 7 days  Dispense: 14 capsule; Refill: 0  -     mupirocin (BACTROBAN) 2 % ointment; Apply topically 3 (three) times daily. for 10 days  Dispense: 30 g; Refill: 0    Antibiotic-induced yeast infection  -     fluconazole (DIFLUCAN) 150 MG Tab; Take 1 tablet (150 mg total) by mouth once. May repeat after 72 hours if symptoms persist for 1 dose  Dispense: 2 tablet; Refill: 0          Medical Decision Making:   History:   Old Medical Records: I decided to obtain old medical records.  Clinical Tests:   Lab Tests: Ordered and Reviewed  Urgent Care Management:  This is a 19 year old female who presented to the urgent care for an evaluation of an abscess. Vital signs were unremarkable. Physical examination reveals there is an  area of tenderness, erythema, and swelling with induration and increased warmth and no fluctuance noted to to the left groin. There is a small tender mobile area of swelling noted to the right axilla that is not indurated or fluctuant or with increased warmth. The patient is non-toxic in appearance. Differential diagnoses included abscess, lymphadenopathy, cellulitis, folliculitis. The patient was given a prescription for Bactroban, Diflucan, and Keflex. I recommend that the patient take Tylenol or Ibuprofen as needed for any pain or fevers. The patient was instructed to return to the urgent care or visit the ED if she develops any fevers, chills, fatigue, or worsening pain/swelling.

## 2025-04-15 ENCOUNTER — PATIENT MESSAGE (OUTPATIENT)
Dept: NEUROLOGY | Facility: CLINIC | Age: 20
End: 2025-04-15
Payer: COMMERCIAL

## 2025-04-15 DIAGNOSIS — G35 MULTIPLE SCLEROSIS, RELAPSING-REMITTING: ICD-10-CM

## 2025-04-15 DIAGNOSIS — F32.A DEPRESSION, UNSPECIFIED DEPRESSION TYPE: Primary | ICD-10-CM

## 2025-05-29 ENCOUNTER — TELEPHONE (OUTPATIENT)
Dept: PSYCHIATRY | Facility: CLINIC | Age: 20
End: 2025-05-29
Payer: COMMERCIAL

## 2025-06-03 DIAGNOSIS — G35 MULTIPLE SCLEROSIS, RELAPSING-REMITTING: Primary | ICD-10-CM

## 2025-06-04 RX ORDER — DIAZEPAM 5 MG/1
5 TABLET ORAL
Qty: 2 TABLET | Refills: 0 | Status: SHIPPED | OUTPATIENT
Start: 2025-06-04 | End: 2025-07-04

## 2025-06-06 ENCOUNTER — HOSPITAL ENCOUNTER (OUTPATIENT)
Dept: RADIOLOGY | Facility: HOSPITAL | Age: 20
Discharge: HOME | End: 2025-06-06
Payer: COMMERCIAL

## 2025-06-06 DIAGNOSIS — G35 MULTIPLE SCLEROSIS, RELAPSING-REMITTING: ICD-10-CM

## 2025-06-06 PROCEDURE — 70553 MRI BRAIN STEM W/O & W/DYE: CPT | Mod: 26,,, | Performed by: RADIOLOGY

## 2025-06-06 PROCEDURE — 72156 MRI NECK SPINE W/O & W/DYE: CPT | Mod: TC

## 2025-06-06 PROCEDURE — A9585 GADOBUTROL INJECTION: HCPCS

## 2025-06-06 PROCEDURE — 72157 MRI CHEST SPINE W/O & W/DYE: CPT | Mod: TC

## 2025-06-06 PROCEDURE — 72157 MRI CHEST SPINE W/O & W/DYE: CPT | Mod: 26,,, | Performed by: RADIOLOGY

## 2025-06-06 PROCEDURE — 70553 MRI BRAIN STEM W/O & W/DYE: CPT | Mod: TC

## 2025-06-06 PROCEDURE — 72156 MRI NECK SPINE W/O & W/DYE: CPT | Mod: 26,,, | Performed by: RADIOLOGY

## 2025-06-06 PROCEDURE — 25500020 PHARM REV CODE 255

## 2025-06-06 RX ORDER — GADOBUTROL 604.72 MG/ML
10 INJECTION INTRAVENOUS
Status: COMPLETED | OUTPATIENT
Start: 2025-06-06 | End: 2025-06-06

## 2025-06-06 RX ADMIN — GADOBUTROL 5 ML: 604.72 INJECTION INTRAVENOUS at 12:06

## 2025-06-10 ENCOUNTER — RESULTS FOLLOW-UP (OUTPATIENT)
Dept: NEUROLOGY | Facility: CLINIC | Age: 20
End: 2025-06-10
Payer: COMMERCIAL

## 2025-06-12 NOTE — PROGRESS NOTES
"Patient ID: Andree Zavala is a 20 y.o. female who presents today for a routine clinic visit for MS.  She was last seen on 12/31/2024.  The history was provided by the patient. She is accompanied by her mom and step dad.     NEURO MULTIPLE SCLEROISIS SUMMARY:   Principle neurological diagnosis:  MS  Date of Symptom Onset:  2/2023  Date of Diagnosis:  12/2023  Disease type at diagnosis:  Relapsing-Remitting MS  Disease type currently:  Relapsing-Remitting MS  Previous Therapy:  First DMT:  None  Current Therapy:  Dimethyl fumarate - 4/2024 - present   Last MRI Brain:  6/6/2025 - stable  Last MRI C-Spine:  6/6/2025 - stable  Last MRI T-Spine:  6/6/2025 - new lesions  Date CSF Completed:  2/28/2024  WBC:  39   RBC:  4  Protein:  40  Glucose:  54  Oligoclonal Bands:  10  Date JCV Completed:  8/30/2024  JCV Index:  3.52  JCV Antibody:  Positive  Lab Notes:  CSF NMO IgG - negative, VDRL - negative, ACE - WNL  Other relevant labs and studies:    8/30/2024: Vitamin D - 27, NMO and MOG - negative, NfL - 5.6       Subjective:     She states that she is feeling stable overall and has not had a sense of relapse since the last time she was seen.     She reports that she has been compliant with DMF and has not missed any doses.     She has noticed that she has some "shakiness"/weakness going down stairs in both legs. She denies any falls.     She does also experience some urinary retention, but she does experience constipations as well. She does wonder if the modafinil is playing a role in the urinary retention because the retention is slightly better since she has not been on it.     She is currently not taking modafinil as she is now finished with nursing school.  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  She does plan on restarting modafinil, when she starts working, but she does wonder if she can get something else because she does not find it to be helpful all the time     She denies weakness, sensory changes, " dysphagia, dysarthria, spasticity, visual changes, cognitive changes, mood disorder, incoordination, pain, heat sensitivity, fatigue, bladder and bowel dysfunction,       SOCIAL HISTORY  Living arrangements - the patient lives alone.  Social History     Socioeconomic History    Marital status: Single   Tobacco Use    Smoking status: Never     Passive exposure: Never    Smokeless tobacco: Never   Substance and Sexual Activity    Alcohol use: No    Drug use: Never    Sexual activity: Yes     Partners: Male     Birth control/protection: Condom, Patch     Comment: Birth control pills     Social Drivers of Health     Financial Resource Strain: Low Risk  (12/29/2023)    Overall Financial Resource Strain (CARDIA)     Difficulty of Paying Living Expenses: Not very hard   Food Insecurity: No Food Insecurity (12/29/2023)    Hunger Vital Sign     Worried About Running Out of Food in the Last Year: Never true     Ran Out of Food in the Last Year: Never true   Transportation Needs: No Transportation Needs (12/29/2023)    PRAPARE - Transportation     Lack of Transportation (Medical): No     Lack of Transportation (Non-Medical): No   Physical Activity: Inactive (12/29/2023)    Exercise Vital Sign     Days of Exercise per Week: 0 days     Minutes of Exercise per Session: 0 min   Stress: No Stress Concern Present (12/29/2023)    [employer] - Occupational Stress Questionnaire     Feeling of Stress : Only a little   Housing Stability: Low Risk  (12/29/2023)    Housing Stability Vital Sign     Unable to Pay for Housing in the Last Year: No     Number of Places Lived in the Last Year: 2     Unstable Housing in the Last Year: No       Medications Ordered Prior to Encounter[1]    Objective:     1. 25 foot timed walk:      8/30/2024     8:00 AM 6/13/2025     9:30 AM   Timed 25 Foot Walk:   Did patient wear an AFO? No No   Was assistive device used? No No   Time for 25 Foot Walk (seconds) 4.01 4.33   Time for  25 Foot Walk (seconds) 4.12 4.5       2. SDMT       No data to display                       NEURO EXAM    In general, the patient is well nourished and appears to be in no acute distress.    MENTAL STATUS: language is fluent, normal verbal comprehension, short-term and remote memory is intact, attention is normal, patient is alert and oriented x 3, fund of knowlege is appropriate by vocabulary.     CRANIAL NERVE EXAM:  There is no internuclear ophthalmoplegia.  Extraocular muscles are intact. No facial asymmetry. Facial sensation is intact bilaterally. There is no dysarthria. Uvula is midline, and palate moves symmetrically. Shoulder shrug intact bilaterally. Tongue protrusion is midline. Hearing is intact to finger rub bilaterally. Neck is supple with full ROM    MOTOR EXAM: Normal bulk and tone throughout UE and LE bilaterally.   No pronator drift; rapid sequential movements are normal; Strength is  5/5 in all groups in the lower extremities and upper extremities    REFLEXES: 2+ and symmetric throughout in all four extremities, except 3+ at bilateral patella     SENSORY EXAM: Normal to light touch, and vibration throughout.    COORDINATION: Normal finger-to-nose exam. Normal heel-to-shin exam.    GAIT: Narrow based and stable. Able to heel walk, toe walk, and perform tandem gait.     Negative Romberg's    Imaging: personally reviewed     Results for orders placed during the hospital encounter of 10/02/24    MRI Brain Demyelinating Without Contrast    Impression  1.    White matter changes as above, consistent with history of demyelinating disease.  2.    Evaluation for new active disease is limited due to lack of contrast.  3.    No acute intracranial abnormality.    Finalized on: 10/2/2024 12:15 PM By:  Kathrin Kelly MD  [facility]# [id number]      2024-10-02 12:17:52.963    [facility]    Results for orders placed during the hospital encounter of 10/02/24    MRI Cervical Spine Demyelinating Without Contrast    Impression  Possible  increased T2 hyperintense lesions in the cervical cord as above consistent with history of demyelinating disease.    Finalized on: 10/2/2024 1:03 PM By:  Kathrin Kelly MD  [facility]# [id number]      2024-10-02 13:06:06.237    [facility]    Results for orders placed during the hospital encounter of 10/02/24    MRI Thoracic Spine Demyelinating Without Contrast    Impression  Stable exam with redemonstration of white matter changes in the cord at T10 and T11 consistent with history of demyelinating disease.  No new white matter lesions.    Finalized on: 10/2/2024 2:02 PM By:  Kathrin Kelly MD  [facility]# [id number]      2024-10-02 14:04:41.173    [facility]    Results for orders placed during the hospital encounter of 06/06/25    MRI Brain Demyelinating W W/O Contrast    Impression  Findings in the cerebral white matter which are typical for multiple sclerosis.  No new or enhancing lesions to suggest active disease.      Electronically signed by: Frank Sandoval MD  Date:    06/06/2025  Time:    13:26    Results for orders placed during the hospital encounter of 06/06/25    MRI Cervical Spine Demyelinating W W/O Contrast    Impression  Stable MRI of the cervical spine with at least 2 subtle foci of increased signal within the cord compatible with demyelinating plaques of MS. No new or enhancing findings.      Electronically signed by: Frank Sandoval MD  Date:    06/06/2025  Time:    13:16    Results for orders placed during the hospital encounter of 06/06/25    MRI Thoracic Spine Demyelinating W W/O Contrast    Impression  New areas of increased signal within the thoracic cord at the T2-3 level and at the T7 vertebral body level.  No abnormal enhancement.  Persistent though less pronounced foci of signal at T10 and T11.  Findings consistent with demyelinating plaques of MS.      Electronically signed by: Frank Sandoval MD  Date:    06/06/2025  Time:    13:21        Labs: personally reviewed      Lab Results   Component Value Date    [id number] 27  (L) 08/30/2024     Lab Results   Component Value Date    JCVINDEX 3.52 (H) 08/30/2024    JCVANTIBODY POSITIVE (A) 08/30/2024     Lab Results   Component Value Date    YT9BMZOK 72.3 02/11/2025    ABSOLUTECD3 1364 02/11/2025    UX8XIYDH 17.4 02/11/2025    ABSOLUTECD8 328 02/11/2025    UV8XMLHM 52.0 02/11/2025    ABSOLUTECD4 981 02/11/2025    LABCD48 2.99 02/11/2025     Lab Results   Component Value Date    WBC 6.85 06/20/2025    RBC 4.05 06/20/2025    HGB 11.2 (L) 06/20/2025    HCT 35.9 (L) 06/20/2025    MCV 89 06/20/2025    MCH 27.7 06/20/2025    MCHC 31.2 (L) 06/20/2025    RDW 14.6 (H) 06/20/2025     06/20/2025    MPV 11.0 06/20/2025    GRAN 3.4 02/11/2025    GRAN 56.1 02/11/2025    LYMPH 26.1 06/20/2025    LYMPH 1.79 06/20/2025    MONO 12.1 06/20/2025    MONO 0.83 06/20/2025    EOS 1.9 06/20/2025    EOS 0.13 06/20/2025    BASO 0.02 02/11/2025    EOSINOPHIL 2.6 02/11/2025    BASOPHIL 0.4 06/20/2025    BASOPHIL 0.03 06/20/2025     Sodium   Date Value Ref Range Status   06/20/2025 139 136 - 145 mmol/L Final   02/11/2025 135 (L) 136 - 145 mmol/L Final     Potassium   Date Value Ref Range Status   06/20/2025 4.4 3.5 - 5.1 mmol/L Final   02/11/2025 4.7 3.5 - 5.1 mmol/L Final     Chloride   Date Value Ref Range Status   06/20/2025 107 95 - 110 mmol/L Final   02/11/2025 102 95 - 110 mmol/L Final     CO2   Date Value Ref Range Status   06/20/2025 23 23 - 29 mmol/L Final   02/11/2025 24 23 - 29 mmol/L Final     Glucose   Date Value Ref Range Status   06/20/2025 96 70 - 110 mg/dL Final   02/11/2025 83 70 - 110 mg/dL Final     BUN   Date Value Ref Range Status   06/20/2025 14 6 - 20 mg/dL Final     Creatinine   Date Value Ref Range Status   06/20/2025 0.6 0.5 - 1.4 mg/dL Final     Calcium   Date Value Ref Range Status   06/20/2025 9.4 8.7 - 10.5 mg/dL Final   02/11/2025 10.0 8.7 - 10.5 mg/dL Final     Protein Total   Date Value Ref Range Status   06/20/2025 8.0 6.0 - 8.4 gm/dL Final     Total Protein   Date Value Ref  Range Status   02/11/2025 7.7 6.0 - 8.4 g/dL Final     Albumin   Date Value Ref Range Status   06/20/2025 4.5 3.5 - 5.2 g/dL Final   02/11/2025 4.3 3.5 - 5.2 g/dL Final     Total Bilirubin   Date Value Ref Range Status   02/11/2025 0.5 0.1 - 1.0 mg/dL Final     Comment:     For infants and newborns, interpretation of results should be based  on gestational age, weight and in agreement with clinical  observations.    Premature Infant recommended reference ranges:  Up to 24 hours.............<8.0 mg/dL  Up to 48 hours............<12.0 mg/dL  3-5 days..................<15.0 mg/dL  6-29 days.................<15.0 mg/dL       Bilirubin Total   Date Value Ref Range Status   06/20/2025 0.2 0.1 - 1.0 mg/dL Final     Comment:     For infants and newborns, interpretation of results should be based   on gestational age, weight and in agreement with clinical   observations.    Premature Infant recommended reference ranges:   0-24 hours:  <8.0 mg/dL   24-48 hours: <12.0 mg/dL   3-5 days:    <15.0 mg/dL   6-29 days:   <15.0 mg/dL     Alkaline Phosphatase   Date Value Ref Range Status   02/11/2025 68 40 - 150 U/L Final     ALP   Date Value Ref Range Status   06/20/2025 75 40 - 150 unit/L Final     AST   Date Value Ref Range Status   06/20/2025 16 11 - 45 unit/L Final   02/11/2025 17 10 - 40 U/L Final     ALT   Date Value Ref Range Status   06/20/2025 10 10 - 44 unit/L Final   02/11/2025 14 10 - 44 U/L Final     Anion Gap   Date Value Ref Range Status   06/20/2025 9 8 - 16 mmol/L Final     eGFR if    Date Value Ref Range Status   02/17/2016 SEE COMMENT >60 mL/min/1.73 m^2 Final     eGFR if non    Date Value Ref Range Status   02/17/2016 SEE COMMENT >60 mL/min/1.73 m^2 Final     Comment:     Calculation used to obtain the estimated glomerular filtration  rate (eGFR) is the CKD-EPI equation. Since race is unknown   in our information system, the eGFR values for   -American and  Non--American patients are given   for each creatinine result.  Test not performed.  GFR calculation is only valid for patients   18 and older.       Lab Results   Component Value Date    HEPBSAG Non-Reactive 06/20/2025    HEPBSAB Reactive 06/20/2025    HEPBCAB Non-Reactive 06/20/2025           MS Impression and Plan:     NEURO MULTIPLE SCLEROSIS IMPRESSION:   Number of relapses in the past year?:  0  Clinical Progression:  Clinically Stable  MRI Progression:  Worsened  Worsening Reason:  New Lesions  MS Classification:  Relapsing-Remitting MS  Current DMT: dimethyl fumarate  DMT:  Switch Disease Modifying therapy  Lutsen Relapse Management: Other  Lutsen Relapse Management comment: No acute treatment needed as patient is feeling stable and lesions were found on routine imaging.   Symptom Management:  Implement change in symptom management  Implement Change in Symptom Management:  Gait and Fatigue  Implement Change in Symptom Management comment:   FATIGUE: will change modafinil to armodafinil   GAIT: refer to PT  Additional Impressions:   Patient with RRMS remaining clinically stable, but new lesions found within thoracic spine on recent imaging, while on DMF.  The new changes on MRIs do warrant a change in DMT at this time to a higher efficacy medication.   Discussed different DMT options in depth, including all anti CD20 medications, S1Ps, Mavenclad, and even clinical trials with frexalimab and remibrutinib. Explained MOA, route, risk profiles, and expectations of each option. She is also not on any contraceptive at this time, so potential fetal risk were discussed with each med as well.    She would like the research coordinator to reach out to her with more information on the trials, which will be arranged. She will also do further research on her own on the other medications discussed.   All questions by patient and family at this time were answered.   Will plan to see patient virtually on 7/2 for  the decision, but encourage to reach out via the portal if she make a decision on DMT before then to get her started as soon as possible.   Will order start labs once decision is made or at next visit.   Repeat MRIs 4-6 months post new DMT start.   Plan discussed and questions were answered to satisfaction.     Problem List Items Addressed This Visit       Multiple sclerosis, relapsing-remitting - Primary    Relevant Medications    armodafiniL (NUVIGIL) 150 mg tablet    Other Relevant Orders    Ambulatory Referral/Consult to Physical Therapy    Excessive daytime sleepiness    Relevant Medications    armodafiniL (NUVIGIL) 150 mg tablet          I spent a total of 66 minutes on the day of the visit.This includes face to face time and non-face to face time preparing to see the patient (eg, review of tests), obtaining and/or reviewing separately obtained history, documenting clinical information in the electronic or other health record, independently interpreting results and communicating results to the patient/family/caregiver, or care coordinator.       GAMA Rivera         [1]   Current Outpatient Medications on File Prior to Visit   Medication Sig Dispense Refill    albuterol (VENTOLIN HFA) 90 mcg/actuation inhaler Inhale 2 puffs into the lungs every 6 (six) hours as needed for Wheezing. Rescue 8 g 0    diazePAM (VALIUM) 5 MG tablet Take 1 tablet (5 mg total) by mouth as needed (Claustrophobia and anxiety in MRI). 2 tablet 0    dimethyl fumarate 240 mg CpDR TAKE 1 CAPSULE BY MOUTH 2 TIMES A  capsule 1    ergocalciferol (ERGOCALCIFEROL) 50,000 unit Cap Take 1 capsule (50,000 Units total) by mouth every 7 days. Take 1 capsule weekly 12 capsule 3    EScitalopram oxalate (LEXAPRO) 20 MG tablet Take 1 tablet (20 mg total) by mouth once daily. 90 tablet 1    hydrOXYzine (ATARAX) 50 MG tablet Take 1 tablet (50 mg total) by mouth 4 (four) times daily as needed for Anxiety. 20 tablet 0     No current  facility-administered medications on file prior to visit.

## 2025-06-13 ENCOUNTER — OFFICE VISIT (OUTPATIENT)
Dept: NEUROLOGY | Facility: CLINIC | Age: 20
End: 2025-06-13
Payer: COMMERCIAL

## 2025-06-13 VITALS
SYSTOLIC BLOOD PRESSURE: 127 MMHG | WEIGHT: 125.88 LBS | HEART RATE: 84 BPM | HEIGHT: 65 IN | BODY MASS INDEX: 20.97 KG/M2 | DIASTOLIC BLOOD PRESSURE: 79 MMHG

## 2025-06-13 DIAGNOSIS — G47.19 EXCESSIVE DAYTIME SLEEPINESS: ICD-10-CM

## 2025-06-13 DIAGNOSIS — G35 MULTIPLE SCLEROSIS, RELAPSING-REMITTING: Primary | ICD-10-CM

## 2025-06-13 PROCEDURE — 99215 OFFICE O/P EST HI 40 MIN: CPT | Mod: S$GLB,,,

## 2025-06-13 PROCEDURE — 99999 PR PBB SHADOW E&M-EST. PATIENT-LVL III: CPT | Mod: PBBFAC,,,

## 2025-06-13 PROCEDURE — 3074F SYST BP LT 130 MM HG: CPT | Mod: CPTII,S$GLB,,

## 2025-06-13 PROCEDURE — G2211 COMPLEX E/M VISIT ADD ON: HCPCS | Mod: S$GLB,,,

## 2025-06-13 PROCEDURE — 3008F BODY MASS INDEX DOCD: CPT | Mod: CPTII,S$GLB,,

## 2025-06-13 PROCEDURE — 3078F DIAST BP <80 MM HG: CPT | Mod: CPTII,S$GLB,,

## 2025-06-13 PROCEDURE — 1159F MED LIST DOCD IN RCRD: CPT | Mod: CPTII,S$GLB,,

## 2025-06-13 PROCEDURE — 1160F RVW MEDS BY RX/DR IN RCRD: CPT | Mod: CPTII,S$GLB,,

## 2025-06-13 RX ORDER — ARMODAFINIL 150 MG/1
150 TABLET ORAL DAILY
Qty: 90 TABLET | Refills: 1 | Status: SHIPPED | OUTPATIENT
Start: 2025-06-13

## 2025-06-13 NOTE — Clinical Note
Hey! She would like to get more information on frex and reyes trials! Thanks! 
3+ pitting (1/2-1 in) (disappears in 1-2 minutes)

## 2025-06-16 ENCOUNTER — PATIENT MESSAGE (OUTPATIENT)
Dept: RESEARCH | Facility: HOSPITAL | Age: 20
End: 2025-06-16
Payer: COMMERCIAL

## 2025-06-17 ENCOUNTER — PATIENT MESSAGE (OUTPATIENT)
Dept: NEUROLOGY | Facility: CLINIC | Age: 20
End: 2025-06-17
Payer: COMMERCIAL

## 2025-06-17 DIAGNOSIS — G35 MULTIPLE SCLEROSIS, RELAPSING-REMITTING: Primary | ICD-10-CM

## 2025-06-20 ENCOUNTER — LAB VISIT (OUTPATIENT)
Dept: LAB | Facility: HOSPITAL | Age: 20
End: 2025-06-20
Payer: COMMERCIAL

## 2025-06-20 DIAGNOSIS — G35 MULTIPLE SCLEROSIS, RELAPSING-REMITTING: ICD-10-CM

## 2025-06-20 LAB
ABSOLUTE EOSINOPHIL (OHS): 0.13 K/UL
ABSOLUTE MONOCYTE (OHS): 0.83 K/UL (ref 0.3–1)
ABSOLUTE NEUTROPHIL COUNT (OHS): 4.04 K/UL (ref 1.8–7.7)
ALBUMIN SERPL BCP-MCNC: 4.5 G/DL (ref 3.5–5.2)
ALP SERPL-CCNC: 75 UNIT/L (ref 40–150)
ALT SERPL W/O P-5'-P-CCNC: 10 UNIT/L (ref 10–44)
ANION GAP (OHS): 9 MMOL/L (ref 8–16)
AST SERPL-CCNC: 16 UNIT/L (ref 11–45)
BASOPHILS # BLD AUTO: 0.03 K/UL
BASOPHILS NFR BLD AUTO: 0.4 %
BILIRUB SERPL-MCNC: 0.2 MG/DL (ref 0.1–1)
BUN SERPL-MCNC: 14 MG/DL (ref 6–20)
CALCIUM SERPL-MCNC: 9.4 MG/DL (ref 8.7–10.5)
CHLORIDE SERPL-SCNC: 107 MMOL/L (ref 95–110)
CO2 SERPL-SCNC: 23 MMOL/L (ref 23–29)
CREAT SERPL-MCNC: 0.6 MG/DL (ref 0.5–1.4)
ERYTHROCYTE [DISTWIDTH] IN BLOOD BY AUTOMATED COUNT: 14.6 % (ref 11.5–14.5)
GFR SERPLBLD CREATININE-BSD FMLA CKD-EPI: >60 ML/MIN/1.73/M2
GLUCOSE SERPL-MCNC: 96 MG/DL (ref 70–110)
HAV AB SER QL IA: REACTIVE
HBV CORE AB SERPL QL IA: NORMAL
HBV SURFACE AB SER-ACNC: >1000 MIU/ML
HBV SURFACE AB SERPL IA-ACNC: REACTIVE M[IU]/ML
HBV SURFACE AG SERPL QL IA: NORMAL
HCG INTACT+B SERPL-ACNC: <2.42 MIU/ML
HCT VFR BLD AUTO: 35.9 % (ref 37–48.5)
HCV AB SERPL QL IA: NORMAL
HGB BLD-MCNC: 11.2 GM/DL (ref 12–16)
HIV 1+2 AB+HIV1 P24 AG SERPL QL IA: NORMAL
IGA SERPL-MCNC: 219 MG/DL (ref 40–350)
IGG SERPL-MCNC: 1411 MG/DL (ref 650–1600)
IGM SERPL-MCNC: 233 MG/DL (ref 50–300)
IMM GRANULOCYTES # BLD AUTO: 0.03 K/UL (ref 0–0.04)
IMM GRANULOCYTES NFR BLD AUTO: 0.4 % (ref 0–0.5)
LYMPHOCYTES # BLD AUTO: 1.79 K/UL (ref 1–4.8)
MCH RBC QN AUTO: 27.7 PG (ref 27–31)
MCHC RBC AUTO-ENTMCNC: 31.2 G/DL (ref 32–36)
MCV RBC AUTO: 89 FL (ref 82–98)
NUCLEATED RBC (/100WBC) (OHS): 0 /100 WBC
PLATELET # BLD AUTO: 299 K/UL (ref 150–450)
PMV BLD AUTO: 11 FL (ref 9.2–12.9)
POTASSIUM SERPL-SCNC: 4.4 MMOL/L (ref 3.5–5.1)
PROT SERPL-MCNC: 8 GM/DL (ref 6–8.4)
RBC # BLD AUTO: 4.05 M/UL (ref 4–5.4)
RELATIVE EOSINOPHIL (OHS): 1.9 %
RELATIVE LYMPHOCYTE (OHS): 26.1 % (ref 18–48)
RELATIVE MONOCYTE (OHS): 12.1 % (ref 4–15)
RELATIVE NEUTROPHIL (OHS): 59.1 % (ref 38–73)
SODIUM SERPL-SCNC: 139 MMOL/L (ref 136–145)
WBC # BLD AUTO: 6.85 K/UL (ref 3.9–12.7)

## 2025-06-20 PROCEDURE — 86787 VARICELLA-ZOSTER ANTIBODY: CPT

## 2025-06-20 PROCEDURE — 36415 COLL VENOUS BLD VENIPUNCTURE: CPT

## 2025-06-20 PROCEDURE — 85025 COMPLETE CBC W/AUTO DIFF WBC: CPT

## 2025-06-20 PROCEDURE — 86682 HELMINTH ANTIBODY: CPT

## 2025-06-20 PROCEDURE — 80053 COMPREHEN METABOLIC PANEL: CPT

## 2025-06-20 PROCEDURE — 86803 HEPATITIS C AB TEST: CPT

## 2025-06-20 PROCEDURE — 87340 HEPATITIS B SURFACE AG IA: CPT

## 2025-06-20 PROCEDURE — 82784 ASSAY IGA/IGD/IGG/IGM EACH: CPT | Mod: 59

## 2025-06-20 PROCEDURE — 84702 CHORIONIC GONADOTROPIN TEST: CPT

## 2025-06-20 PROCEDURE — 86480 TB TEST CELL IMMUN MEASURE: CPT

## 2025-06-20 PROCEDURE — 86706 HEP B SURFACE ANTIBODY: CPT

## 2025-06-20 PROCEDURE — 87389 HIV-1 AG W/HIV-1&-2 AB AG IA: CPT

## 2025-06-20 PROCEDURE — 86704 HEP B CORE ANTIBODY TOTAL: CPT

## 2025-06-20 PROCEDURE — 86790 VIRUS ANTIBODY NOS: CPT

## 2025-06-23 ENCOUNTER — PATIENT MESSAGE (OUTPATIENT)
Dept: NEUROLOGY | Facility: CLINIC | Age: 20
End: 2025-06-23
Payer: COMMERCIAL

## 2025-06-23 LAB
MITOGEN MINUS NIL (OHS): 9.99
NIL TB SYNCED (OHS): 0.01
QUANTIFERON GOLD INTERP (OHS): NEGATIVE
TB1 AG MINUS NIL (OHS): <0
TB2 AG MINUS NIL (OHS): 0
V.ZOSTER IGG INTERP (OHS): POSITIVE
VARICELLA ZOSTER IGG (OHS): 2.48 S/CO

## 2025-06-24 ENCOUNTER — TELEPHONE (OUTPATIENT)
Dept: RESEARCH | Facility: HOSPITAL | Age: 20
End: 2025-06-24
Payer: COMMERCIAL

## 2025-06-24 LAB — STRONGYLOIDES IGG SER QL IA: NEGATIVE

## 2025-06-24 NOTE — TELEPHONE ENCOUNTER
[id number]   Called patient at 1539 to follow up about the potential trials. Informed her that she not eligible at this time due to the new thoracic lesion being non-enhancing in nature.    Offered option to have MRI With Contrast repeated under SOC to see if the new lesion has changed, if she is interested in being reconsidered, per Dr. Cloud. Mentioned that this is not a medically necessary option and that the MRI would be billed to her insurance.     Patient kindly informed me that she has decided to proceed with Kesimpta at this time.  She was thanked for her time and consideration.

## 2025-06-25 ENCOUNTER — TELEPHONE (OUTPATIENT)
Dept: NEUROLOGY | Facility: CLINIC | Age: 20
End: 2025-06-25
Payer: COMMERCIAL

## 2025-06-25 DIAGNOSIS — G35 MULTIPLE SCLEROSIS, RELAPSING-REMITTING: Primary | ICD-10-CM

## 2025-06-25 RX ORDER — OFATUMUMAB 20 MG/.4ML
INJECTION, SOLUTION SUBCUTANEOUS
Qty: 1.2 ML | Refills: 0 | Status: ACTIVE | OUTPATIENT
Start: 2025-06-25

## 2025-06-25 RX ORDER — OFATUMUMAB 20 MG/.4ML
INJECTION, SOLUTION SUBCUTANEOUS
Qty: 0.4 ML | Refills: 4 | Status: ACTIVE | OUTPATIENT
Start: 2025-06-25

## 2025-06-25 NOTE — TELEPHONE ENCOUNTER
Kesimpta new start    Labs:  6/20/25  WBC 6.85  ALC 1788  AST 16, ALT 10  Hcg -  IgG  1411  IgM  233  IgA 219  HBsAg - anti-Hbc - anti-Hbs +, immune, no current or past infection  Hep C Ab -   Hep A IgG +, immune   HIV -  Varicella IgG +, immune   Strongyloides -   TB gold -    Vaccines:  Influenza: Due 2025 - 2026 Flu season  Pneumonia (Prevnar 20): One dose due now  Tetanus (TDAP): 2/8/2025, due 2/8/2035  Hepatitis B (Heplisav-B):  Immune per labs on 6/20/25  Hepatitis A (Havrix):  Immune per labs on 6/20/25  Shingles (Shingrix): One dose due now, then another dose due in 2 months  Covid:  Due 2025 - 2026 Booster  Respiratory Syncytial Virus (Arexvy): Not a candidate at this time    Start Form:   Completed via CMM on 6/25/25    [place]:    Check with CB    Contraception:   Sent tidy message about fetal risk - needs pregnancy test

## 2025-07-13 NOTE — TELEPHONE ENCOUNTER
----- Message from Rebecca Alarcon sent at 2/21/2024 12:15 PM CST -----  Type: General Call Back         Name of Caller:pt  Would the patient rather a call back or a response via [facility]? call  Best Call Back Number:[phone]   Additional Information: Pt states she has not been contacted to have her lumbar puncture scheduled as of yet. Please call pt with further info and assistance. Thank You.      13-Jul-2025 21:32

## 2025-08-05 DIAGNOSIS — G47.19 EXCESSIVE DAYTIME SLEEPINESS: ICD-10-CM

## 2025-08-05 DIAGNOSIS — G35 MULTIPLE SCLEROSIS, RELAPSING-REMITTING: ICD-10-CM

## 2025-08-05 RX ORDER — ARMODAFINIL 150 MG/1
150 TABLET ORAL DAILY
Qty: 90 TABLET | Refills: 1 | Status: SHIPPED | OUTPATIENT
Start: 2025-08-05

## 2025-08-15 ENCOUNTER — PATIENT MESSAGE (OUTPATIENT)
Dept: NEUROLOGY | Facility: CLINIC | Age: 20
End: 2025-08-15
Payer: COMMERCIAL

## 2025-08-18 ENCOUNTER — PATIENT MESSAGE (OUTPATIENT)
Dept: NEUROLOGY | Facility: CLINIC | Age: 20
End: 2025-08-18
Payer: COMMERCIAL

## 2025-08-18 ENCOUNTER — PATIENT MESSAGE (OUTPATIENT)
Dept: PSYCHIATRY | Facility: CLINIC | Age: 20
End: 2025-08-18
Payer: COMMERCIAL

## 2025-08-22 DIAGNOSIS — G35 MULTIPLE SCLEROSIS, RELAPSING-REMITTING: ICD-10-CM

## 2025-08-22 DIAGNOSIS — F32.A DEPRESSION, UNSPECIFIED DEPRESSION TYPE: ICD-10-CM

## 2025-08-23 RX ORDER — ESCITALOPRAM OXALATE 20 MG/1
20 TABLET ORAL
Qty: 90 TABLET | Refills: 3 | Status: SHIPPED | OUTPATIENT
Start: 2025-08-23